# Patient Record
Sex: MALE | Race: WHITE | NOT HISPANIC OR LATINO | Employment: OTHER | URBAN - METROPOLITAN AREA
[De-identification: names, ages, dates, MRNs, and addresses within clinical notes are randomized per-mention and may not be internally consistent; named-entity substitution may affect disease eponyms.]

---

## 2020-01-05 ENCOUNTER — APPOINTMENT (OUTPATIENT)
Dept: GENERAL RADIOLOGY | Facility: HOSPITAL | Age: 75
End: 2020-01-05

## 2020-01-05 ENCOUNTER — HOSPITAL ENCOUNTER (INPATIENT)
Facility: HOSPITAL | Age: 75
LOS: 2 days | Discharge: ANOTHER HOSPITAL | End: 2020-01-08
Attending: EMERGENCY MEDICINE | Admitting: HOSPITALIST

## 2020-01-05 DIAGNOSIS — R07.9 CHEST PAIN, UNSPECIFIED TYPE: Primary | ICD-10-CM

## 2020-01-05 DIAGNOSIS — A41.9 SEPSIS, DUE TO UNSPECIFIED ORGANISM, UNSPECIFIED WHETHER ACUTE ORGAN DYSFUNCTION PRESENT (HCC): ICD-10-CM

## 2020-01-05 DIAGNOSIS — D72.829 LEUKOCYTOSIS, UNSPECIFIED TYPE: ICD-10-CM

## 2020-01-05 PROBLEM — I25.10 CORONARY ARTERY DISEASE: Chronic | Status: ACTIVE | Noted: 2020-01-05

## 2020-01-05 PROBLEM — I10 ESSENTIAL HYPERTENSION: Chronic | Status: ACTIVE | Noted: 2020-01-05

## 2020-01-05 PROBLEM — I73.9 PERIPHERAL VASCULAR DISEASE (HCC): Chronic | Status: ACTIVE | Noted: 2020-01-05

## 2020-01-05 PROBLEM — E78.5 HYPERLIPEMIA: Chronic | Status: ACTIVE | Noted: 2020-01-05

## 2020-01-05 PROBLEM — N42.9 BENIGN PROSTATIC DISEASE: Chronic | Status: ACTIVE | Noted: 2020-01-05

## 2020-01-05 PROBLEM — E11.9 DIABETES MELLITUS (HCC): Chronic | Status: ACTIVE | Noted: 2020-01-05

## 2020-01-05 PROBLEM — K21.9 GERD (GASTROESOPHAGEAL REFLUX DISEASE): Chronic | Status: ACTIVE | Noted: 2020-01-05

## 2020-01-05 PROBLEM — B37.9 CANDIDIASIS: Chronic | Status: ACTIVE | Noted: 2020-01-05

## 2020-01-05 LAB
ALBUMIN SERPL-MCNC: 3.3 G/DL (ref 3.5–5.2)
ALBUMIN/GLOB SERPL: 0.9 G/DL
ALP SERPL-CCNC: 190 U/L (ref 39–117)
ALT SERPL W P-5'-P-CCNC: 48 U/L (ref 1–41)
ANION GAP SERPL CALCULATED.3IONS-SCNC: 15 MMOL/L (ref 5–15)
APTT PPP: 20.5 SECONDS (ref 24–31)
AST SERPL-CCNC: 56 U/L (ref 1–40)
BACTERIA UR QL AUTO: ABNORMAL /HPF
BASOPHILS # BLD AUTO: 0.1 10*3/MM3 (ref 0–0.2)
BASOPHILS NFR BLD AUTO: 0.3 % (ref 0–1.5)
BILIRUB SERPL-MCNC: 0.2 MG/DL (ref 0.2–1.2)
BILIRUB UR QL STRIP: ABNORMAL
BUN BLD-MCNC: 20 MG/DL (ref 8–23)
BUN/CREAT SERPL: 29.9 (ref 7–25)
CALCIUM SPEC-SCNC: 9.7 MG/DL (ref 8.6–10.5)
CHLORIDE SERPL-SCNC: 99 MMOL/L (ref 98–107)
CLARITY UR: ABNORMAL
CO2 SERPL-SCNC: 20 MMOL/L (ref 22–29)
COLOR UR: ABNORMAL
CREAT BLD-MCNC: 0.67 MG/DL (ref 0.76–1.27)
D-LACTATE SERPL-SCNC: 3.1 MMOL/L (ref 0.5–2)
D-LACTATE SERPL-SCNC: 3.3 MMOL/L (ref 0.5–2)
DEPRECATED RDW RBC AUTO: 61.3 FL (ref 37–54)
EOSINOPHIL # BLD AUTO: 0.2 10*3/MM3 (ref 0–0.4)
EOSINOPHIL NFR BLD AUTO: 1 % (ref 0.3–6.2)
ERYTHROCYTE [DISTWIDTH] IN BLOOD BY AUTOMATED COUNT: 21.1 % (ref 12.3–15.4)
FLUAV SUBTYP SPEC NAA+PROBE: NOT DETECTED
FLUBV RNA ISLT QL NAA+PROBE: NOT DETECTED
GFR SERPL CREATININE-BSD FRML MDRD: 116 ML/MIN/1.73
GFR SERPL CREATININE-BSD FRML MDRD: 140 ML/MIN/1.73
GLOBULIN UR ELPH-MCNC: 3.6 GM/DL
GLUCOSE BLD-MCNC: 377 MG/DL (ref 65–99)
GLUCOSE BLDC GLUCOMTR-MCNC: 388 MG/DL (ref 70–105)
GLUCOSE UR STRIP-MCNC: NEGATIVE MG/DL
HCT VFR BLD AUTO: 34.8 % (ref 37.5–51)
HGB BLD-MCNC: 10.9 G/DL (ref 13–17.7)
HGB UR QL STRIP.AUTO: ABNORMAL
HOLD SPECIMEN: NORMAL
HYALINE CASTS UR QL AUTO: ABNORMAL /LPF
INR PPP: 1.03 (ref 0.9–1.1)
KETONES UR QL STRIP: ABNORMAL
LEUKOCYTE ESTERASE UR QL STRIP.AUTO: ABNORMAL
LYMPHOCYTES # BLD AUTO: 1.2 10*3/MM3 (ref 0.7–3.1)
LYMPHOCYTES NFR BLD AUTO: 5.5 % (ref 19.6–45.3)
MCH RBC QN AUTO: 25.6 PG (ref 26.6–33)
MCHC RBC AUTO-ENTMCNC: 31.4 G/DL (ref 31.5–35.7)
MCV RBC AUTO: 81.3 FL (ref 79–97)
MONOCYTES # BLD AUTO: 1.2 10*3/MM3 (ref 0.1–0.9)
MONOCYTES NFR BLD AUTO: 5.9 % (ref 5–12)
NEUTROPHILS # BLD AUTO: 18.4 10*3/MM3 (ref 1.7–7)
NEUTROPHILS NFR BLD AUTO: 87.3 % (ref 42.7–76)
NITRITE UR QL STRIP: POSITIVE
NRBC BLD AUTO-RTO: 0 /100 WBC (ref 0–0.2)
NT-PROBNP SERPL-MCNC: 1272 PG/ML (ref 5–1800)
NT-PROBNP SERPL-MCNC: 810.6 PG/ML (ref 5–1800)
PH UR STRIP.AUTO: 5.5 [PH] (ref 5–8)
PLATELET # BLD AUTO: 487 10*3/MM3 (ref 140–450)
PMV BLD AUTO: 6.4 FL (ref 6–12)
POTASSIUM BLD-SCNC: 4.4 MMOL/L (ref 3.5–5.2)
PROT SERPL-MCNC: 6.9 G/DL (ref 6–8.5)
PROT UR QL STRIP: ABNORMAL
PROTHROMBIN TIME: 10.8 SECONDS (ref 9.6–11.7)
RBC # BLD AUTO: 4.28 10*6/MM3 (ref 4.14–5.8)
RBC # UR: ABNORMAL /HPF
REF LAB TEST METHOD: ABNORMAL
SODIUM BLD-SCNC: 134 MMOL/L (ref 136–145)
SP GR UR STRIP: 1.02 (ref 1–1.03)
SQUAMOUS #/AREA URNS HPF: ABNORMAL /HPF
TROPONIN T SERPL-MCNC: 0.04 NG/ML (ref 0–0.03)
TROPONIN T SERPL-MCNC: 0.3 NG/ML (ref 0–0.03)
UROBILINOGEN UR QL STRIP: ABNORMAL
WBC NRBC COR # BLD: 21.1 10*3/MM3 (ref 3.4–10.8)
WBC UR QL AUTO: ABNORMAL /HPF

## 2020-01-05 PROCEDURE — 25010000002 CEFEPIME PER 500 MG: Performed by: EMERGENCY MEDICINE

## 2020-01-05 PROCEDURE — 93005 ELECTROCARDIOGRAM TRACING: CPT

## 2020-01-05 PROCEDURE — 84484 ASSAY OF TROPONIN QUANT: CPT | Performed by: EMERGENCY MEDICINE

## 2020-01-05 PROCEDURE — 25010000002 MICAFUNGIN SODIUM 100 MG RECONSTITUTED SOLUTION 1 EACH VIAL: Performed by: INTERNAL MEDICINE

## 2020-01-05 PROCEDURE — 63710000001 INSULIN GLARGINE PER 5 UNITS: Performed by: INTERNAL MEDICINE

## 2020-01-05 PROCEDURE — 87186 SC STD MICRODIL/AGAR DIL: CPT | Performed by: EMERGENCY MEDICINE

## 2020-01-05 PROCEDURE — 71045 X-RAY EXAM CHEST 1 VIEW: CPT

## 2020-01-05 PROCEDURE — 93005 ELECTROCARDIOGRAM TRACING: CPT | Performed by: EMERGENCY MEDICINE

## 2020-01-05 PROCEDURE — 25010000002 MICAFUNGIN PER 1 MG: Performed by: INTERNAL MEDICINE

## 2020-01-05 PROCEDURE — 83880 ASSAY OF NATRIURETIC PEPTIDE: CPT | Performed by: INTERNAL MEDICINE

## 2020-01-05 PROCEDURE — 83880 ASSAY OF NATRIURETIC PEPTIDE: CPT | Performed by: EMERGENCY MEDICINE

## 2020-01-05 PROCEDURE — 85610 PROTHROMBIN TIME: CPT | Performed by: EMERGENCY MEDICINE

## 2020-01-05 PROCEDURE — 85730 THROMBOPLASTIN TIME PARTIAL: CPT | Performed by: EMERGENCY MEDICINE

## 2020-01-05 PROCEDURE — 84484 ASSAY OF TROPONIN QUANT: CPT | Performed by: INTERNAL MEDICINE

## 2020-01-05 PROCEDURE — 81001 URINALYSIS AUTO W/SCOPE: CPT | Performed by: EMERGENCY MEDICINE

## 2020-01-05 PROCEDURE — 63710000001 INSULIN LISPRO (HUMAN) PER 5 UNITS: Performed by: INTERNAL MEDICINE

## 2020-01-05 PROCEDURE — 83605 ASSAY OF LACTIC ACID: CPT

## 2020-01-05 PROCEDURE — 87502 INFLUENZA DNA AMP PROBE: CPT | Performed by: EMERGENCY MEDICINE

## 2020-01-05 PROCEDURE — P9612 CATHETERIZE FOR URINE SPEC: HCPCS

## 2020-01-05 PROCEDURE — 82962 GLUCOSE BLOOD TEST: CPT

## 2020-01-05 PROCEDURE — 87040 BLOOD CULTURE FOR BACTERIA: CPT | Performed by: EMERGENCY MEDICINE

## 2020-01-05 PROCEDURE — 99223 1ST HOSP IP/OBS HIGH 75: CPT | Performed by: INTERNAL MEDICINE

## 2020-01-05 PROCEDURE — 87086 URINE CULTURE/COLONY COUNT: CPT | Performed by: EMERGENCY MEDICINE

## 2020-01-05 PROCEDURE — 99285 EMERGENCY DEPT VISIT HI MDM: CPT

## 2020-01-05 PROCEDURE — G0378 HOSPITAL OBSERVATION PER HR: HCPCS

## 2020-01-05 PROCEDURE — 80053 COMPREHEN METABOLIC PANEL: CPT | Performed by: EMERGENCY MEDICINE

## 2020-01-05 PROCEDURE — 87150 DNA/RNA AMPLIFIED PROBE: CPT | Performed by: EMERGENCY MEDICINE

## 2020-01-05 PROCEDURE — 87077 CULTURE AEROBIC IDENTIFY: CPT | Performed by: EMERGENCY MEDICINE

## 2020-01-05 PROCEDURE — 85025 COMPLETE CBC W/AUTO DIFF WBC: CPT | Performed by: EMERGENCY MEDICINE

## 2020-01-05 RX ORDER — ASPIRIN 325 MG
325 TABLET ORAL DAILY
COMMUNITY

## 2020-01-05 RX ORDER — SODIUM CHLORIDE 0.9 % (FLUSH) 0.9 %
10 SYRINGE (ML) INJECTION AS NEEDED
Status: DISCONTINUED | OUTPATIENT
Start: 2020-01-05 | End: 2020-01-08 | Stop reason: HOSPADM

## 2020-01-05 RX ORDER — FAMOTIDINE 20 MG/1
20 TABLET, FILM COATED ORAL 2 TIMES DAILY PRN
COMMUNITY

## 2020-01-05 RX ORDER — ASPIRIN 325 MG
325 TABLET ORAL DAILY
Status: DISCONTINUED | OUTPATIENT
Start: 2020-01-05 | End: 2020-01-08 | Stop reason: HOSPADM

## 2020-01-05 RX ORDER — DIAPER,BRIEF,INFANT-TODD,DISP
1 EACH MISCELLANEOUS EVERY 12 HOURS SCHEDULED
Status: DISCONTINUED | OUTPATIENT
Start: 2020-01-05 | End: 2020-01-08 | Stop reason: HOSPADM

## 2020-01-05 RX ORDER — PANTOPRAZOLE SODIUM 40 MG/1
40 TABLET, DELAYED RELEASE ORAL EVERY MORNING
Status: DISCONTINUED | OUTPATIENT
Start: 2020-01-06 | End: 2020-01-08 | Stop reason: HOSPADM

## 2020-01-05 RX ORDER — CRANBERRY CONC/C/BACILL COAG 250-30-15
1 TABLET ORAL DAILY PRN
COMMUNITY

## 2020-01-05 RX ORDER — NITROGLYCERIN 0.4 MG/1
0.4 TABLET SUBLINGUAL
Status: DISCONTINUED | OUTPATIENT
Start: 2020-01-05 | End: 2020-01-08 | Stop reason: HOSPADM

## 2020-01-05 RX ORDER — SODIUM CHLORIDE 0.9 % (FLUSH) 0.9 %
10 SYRINGE (ML) INJECTION EVERY 12 HOURS SCHEDULED
Status: DISCONTINUED | OUTPATIENT
Start: 2020-01-05 | End: 2020-01-08 | Stop reason: HOSPADM

## 2020-01-05 RX ORDER — ONDANSETRON 4 MG/1
4 TABLET, FILM COATED ORAL EVERY 6 HOURS PRN
Status: DISCONTINUED | OUTPATIENT
Start: 2020-01-05 | End: 2020-01-08 | Stop reason: HOSPADM

## 2020-01-05 RX ORDER — TAMSULOSIN HYDROCHLORIDE 0.4 MG/1
0.4 CAPSULE ORAL DAILY
COMMUNITY

## 2020-01-05 RX ORDER — IPRATROPIUM BROMIDE AND ALBUTEROL SULFATE 2.5; .5 MG/3ML; MG/3ML
3 SOLUTION RESPIRATORY (INHALATION) 3 TIMES DAILY PRN
COMMUNITY

## 2020-01-05 RX ORDER — INSULIN GLARGINE 100 [IU]/ML
10 INJECTION, SOLUTION SUBCUTANEOUS NIGHTLY
Status: DISCONTINUED | OUTPATIENT
Start: 2020-01-05 | End: 2020-01-08 | Stop reason: HOSPADM

## 2020-01-05 RX ORDER — GABAPENTIN 100 MG/1
100 CAPSULE ORAL 2 TIMES DAILY
Status: DISCONTINUED | OUTPATIENT
Start: 2020-01-05 | End: 2020-01-08 | Stop reason: HOSPADM

## 2020-01-05 RX ORDER — ATORVASTATIN CALCIUM 40 MG/1
40 TABLET, FILM COATED ORAL DAILY
Status: DISCONTINUED | OUTPATIENT
Start: 2020-01-05 | End: 2020-01-08 | Stop reason: HOSPADM

## 2020-01-05 RX ORDER — NICOTINE POLACRILEX 4 MG
15 LOZENGE BUCCAL
Status: DISCONTINUED | OUTPATIENT
Start: 2020-01-05 | End: 2020-01-08 | Stop reason: HOSPADM

## 2020-01-05 RX ORDER — GLIPIZIDE 10 MG/1
10 TABLET ORAL
COMMUNITY

## 2020-01-05 RX ORDER — METOPROLOL TARTRATE 50 MG/1
50 TABLET, FILM COATED ORAL 2 TIMES DAILY
COMMUNITY

## 2020-01-05 RX ORDER — ONDANSETRON 2 MG/ML
4 INJECTION INTRAMUSCULAR; INTRAVENOUS EVERY 6 HOURS PRN
Status: DISCONTINUED | OUTPATIENT
Start: 2020-01-05 | End: 2020-01-08 | Stop reason: HOSPADM

## 2020-01-05 RX ORDER — DIAPER,BRIEF,INFANT-TODD,DISP
1 EACH MISCELLANEOUS 2 TIMES DAILY PRN
COMMUNITY

## 2020-01-05 RX ORDER — TAMSULOSIN HYDROCHLORIDE 0.4 MG/1
0.4 CAPSULE ORAL NIGHTLY
Status: DISCONTINUED | OUTPATIENT
Start: 2020-01-05 | End: 2020-01-08 | Stop reason: HOSPADM

## 2020-01-05 RX ORDER — ATORVASTATIN CALCIUM 40 MG/1
40 TABLET, FILM COATED ORAL DAILY
COMMUNITY

## 2020-01-05 RX ORDER — GABAPENTIN 100 MG/1
100 CAPSULE ORAL 2 TIMES DAILY
COMMUNITY
Start: 2019-12-04

## 2020-01-05 RX ORDER — IPRATROPIUM BROMIDE AND ALBUTEROL SULFATE 2.5; .5 MG/3ML; MG/3ML
3 SOLUTION RESPIRATORY (INHALATION) 3 TIMES DAILY PRN
Status: DISCONTINUED | OUTPATIENT
Start: 2020-01-05 | End: 2020-01-08 | Stop reason: HOSPADM

## 2020-01-05 RX ORDER — HYDROCODONE BITARTRATE AND ACETAMINOPHEN 5; 325 MG/1; MG/1
1 TABLET ORAL EVERY 6 HOURS PRN
Status: DISCONTINUED | OUTPATIENT
Start: 2020-01-05 | End: 2020-01-08 | Stop reason: HOSPADM

## 2020-01-05 RX ORDER — NITROGLYCERIN 0.4 MG/1
0.4 TABLET SUBLINGUAL
COMMUNITY

## 2020-01-05 RX ORDER — METOPROLOL TARTRATE 50 MG/1
50 TABLET, FILM COATED ORAL EVERY 12 HOURS SCHEDULED
Status: DISCONTINUED | OUTPATIENT
Start: 2020-01-05 | End: 2020-01-06

## 2020-01-05 RX ORDER — HYDROCODONE BITARTRATE AND ACETAMINOPHEN 7.5; 325 MG/1; MG/1
1 TABLET ORAL ONCE
Status: COMPLETED | OUTPATIENT
Start: 2020-01-05 | End: 2020-01-05

## 2020-01-05 RX ORDER — DEXTROSE MONOHYDRATE 25 G/50ML
25 INJECTION, SOLUTION INTRAVENOUS
Status: DISCONTINUED | OUTPATIENT
Start: 2020-01-05 | End: 2020-01-08 | Stop reason: HOSPADM

## 2020-01-05 RX ORDER — DOCUSATE SODIUM 100 MG/1
100 CAPSULE, LIQUID FILLED ORAL 2 TIMES DAILY PRN
Status: DISCONTINUED | OUTPATIENT
Start: 2020-01-05 | End: 2020-01-08 | Stop reason: HOSPADM

## 2020-01-05 RX ORDER — ZINC OXIDE 20 %
1 OINTMENT (GRAM) TOPICAL 2 TIMES DAILY PRN
COMMUNITY
End: 2020-01-05

## 2020-01-05 RX ADMIN — ATORVASTATIN CALCIUM 40 MG: 40 TABLET, FILM COATED ORAL at 21:57

## 2020-01-05 RX ADMIN — SODIUM CHLORIDE 500 ML: 900 INJECTION, SOLUTION INTRAVENOUS at 14:30

## 2020-01-05 RX ADMIN — HYDROCODONE BITARTRATE AND ACETAMINOPHEN 1 TABLET: 5; 325 TABLET ORAL at 21:57

## 2020-01-05 RX ADMIN — GABAPENTIN 100 MG: 100 CAPSULE ORAL at 21:57

## 2020-01-05 RX ADMIN — INSULIN LISPRO 9 UNITS: 100 INJECTION, SOLUTION INTRAVENOUS; SUBCUTANEOUS at 22:00

## 2020-01-05 RX ADMIN — INSULIN GLARGINE 10 UNITS: 100 INJECTION, SOLUTION SUBCUTANEOUS at 21:58

## 2020-01-05 RX ADMIN — Medication 10 ML: at 22:03

## 2020-01-05 RX ADMIN — HYDROCODONE BITARTRATE AND ACETAMINOPHEN 1 TABLET: 7.5; 325 TABLET ORAL at 14:41

## 2020-01-05 RX ADMIN — MICAFUNGIN SODIUM 150 MG: 10 INJECTION, POWDER, LYOPHILIZED, FOR SOLUTION INTRAVENOUS at 20:56

## 2020-01-05 RX ADMIN — TAMSULOSIN HYDROCHLORIDE 0.4 MG: 0.4 CAPSULE ORAL at 22:03

## 2020-01-05 RX ADMIN — METOPROLOL TARTRATE 50 MG: 50 TABLET, FILM COATED ORAL at 22:00

## 2020-01-05 RX ADMIN — SODIUM CHLORIDE 500 ML: 900 INJECTION, SOLUTION INTRAVENOUS at 12:32

## 2020-01-05 RX ADMIN — CEFEPIME HYDROCHLORIDE 2 G: 2 INJECTION, POWDER, FOR SOLUTION INTRAVENOUS at 14:38

## 2020-01-05 NOTE — ED PROVIDER NOTES
Subjective   74-year-old male with a history of coronary artery disease presents with chest pain.  Patient states the pain started this morning and has been constant.  The pain is in the center of his chest and radiates to his neck.  He has had associated shortness of breath and diaphoresis.  He denies any alleviating or exacerbating factors.  Pain is described as moderate in intensity.      History provided by:  Patient      Review of Systems   Constitutional: Positive for diaphoresis. Negative for fever.   HENT: Negative for congestion and sore throat.    Eyes: Negative for pain and redness.   Respiratory: Positive for shortness of breath. Negative for cough.    Cardiovascular: Positive for chest pain. Negative for palpitations.   Gastrointestinal: Negative for abdominal pain and vomiting.   Genitourinary: Negative for dysuria and frequency.   Musculoskeletal: Negative for back pain.   Skin: Negative for rash.   Neurological: Negative for dizziness and headaches.   Psychiatric/Behavioral: Negative for behavioral problems and confusion.       Past Medical History:   Diagnosis Date   • Angina at rest (CMS/[facility])    • Benign prostatic disease    • Candidiasis    • Constipation    • Cutaneous abscess     right lower limb   • CVA (cerebral vascular accident) (CMS/[facility])    • Diabetes mellitus (CMS/[facility])     Type 2   • Essential hypertension    • GERD (gastroesophageal reflux disease)    • Hyperlipemia    • Hypoglycemia    • Iron deficiency anemia    • Peripheral vascular disease (CMS/[facility])    • Urethral stricture    • UTI (urinary tract infection)        Allergies   Allergen Reactions   • Keflex [Cephalexin] Unknown - High Severity     Unknown     • Lisinopril Unknown - High Severity     unknown       History reviewed. No pertinent surgical history.    History reviewed. No pertinent family history.    Social History     Socioeconomic History   • Marital status:      Spouse name: Not on file   • Number of children: Not  "on file   • Years of education: Not on file   • Highest education level: Not on file       /68 (BP Location: Right arm, Patient Position: Lying)   Pulse 114   Temp 99.3 °F (37.4 °C) (Rectal)   Resp 16   Ht 180.3 cm (71\")   Wt 70.3 kg (155 lb)   SpO2 100%   BMI 21.62 kg/m²       Objective   Physical Exam   Constitutional: He is oriented to person, place, and time. He appears well-developed and well-nourished.   HENT:   Head: Normocephalic and atraumatic.   Eyes: Pupils are equal, round, and reactive to light. EOM are normal.   Neck: Normal range of motion. Neck supple.   Cardiovascular: Normal rate, regular rhythm and normal heart sounds.   Pulmonary/Chest: Effort normal and breath sounds normal. No respiratory distress.   Abdominal: Soft. Bowel sounds are normal. There is no tenderness.   Genitourinary:   Genitourinary Comments: Indwelling Cedeno catheter present   Musculoskeletal:   Right AKA, there is a wound VAC in place to the right groin area   Neurological: He is alert and oriented to person, place, and time.   Skin: Skin is warm and dry.   Nursing note and vitals reviewed.      Procedures           ED Course      My interpretation of EKG shows sinus tachycardia, rate of 118, incomplete left bundle branch block     Results for orders placed or performed during the hospital encounter of 01/05/20   Comprehensive Metabolic Panel   Result Value Ref Range    Glucose 377 (H) 65 - 99 mg/dL    BUN 20 8 - 23 mg/dL    Creatinine 0.67 (L) 0.76 - 1.27 mg/dL    Sodium 134 (L) 136 - 145 mmol/L    Potassium 4.4 3.5 - 5.2 mmol/L    Chloride 99 98 - 107 mmol/L    CO2 20.0 (L) 22.0 - 29.0 mmol/L    Calcium 9.7 8.6 - 10.5 mg/dL    Total Protein 6.9 6.0 - 8.5 g/dL    Albumin 3.30 (L) 3.50 - 5.20 g/dL    ALT (SGPT) 48 (H) 1 - 41 U/L    AST (SGOT) 56 (H) 1 - 40 U/L    Alkaline Phosphatase 190 (H) 39 - 117 U/L    Total Bilirubin 0.2 0.2 - 1.2 mg/dL    eGFR Non African Amer 116 >60 mL/min/1.73    eGFR  African Amer 140 " >60 mL/min/1.73    Globulin 3.6 gm/dL    A/G Ratio 0.9 g/dL    BUN/Creatinine Ratio 29.9 (H) 7.0 - 25.0    Anion Gap 15.0 5.0 - 15.0 mmol/L   Protime-INR   Result Value Ref Range    Protime 10.8 9.6 - 11.7 Seconds    INR 1.03 0.90 - 1.10   aPTT   Result Value Ref Range    PTT 20.5 (L) 24.0 - 31.0 seconds   Troponin   Result Value Ref Range    Troponin T 0.039 (C) 0.000 - 0.030 ng/mL   BNP   Result Value Ref Range    proBNP 1,272.0 5.0-1,800.0 pg/mL   CBC Auto Differential   Result Value Ref Range    WBC 21.10 (H) 3.40 - 10.80 10*3/mm3    RBC 4.28 4.14 - 5.80 10*6/mm3    Hemoglobin 10.9 (L) 13.0 - 17.7 g/dL    Hematocrit 34.8 (L) 37.5 - 51.0 %    MCV 81.3 79.0 - 97.0 fL    MCH 25.6 (L) 26.6 - 33.0 pg    MCHC 31.4 (L) 31.5 - 35.7 g/dL    RDW 21.1 (H) 12.3 - 15.4 %    RDW-SD 61.3 (H) 37.0 - 54.0 fl    MPV 6.4 6.0 - 12.0 fL    Platelets 487 (H) 140 - 450 10*3/mm3    Neutrophil % 87.3 (H) 42.7 - 76.0 %    Lymphocyte % 5.5 (L) 19.6 - 45.3 %    Monocyte % 5.9 5.0 - 12.0 %    Eosinophil % 1.0 0.3 - 6.2 %    Basophil % 0.3 0.0 - 1.5 %    Neutrophils, Absolute 18.40 (H) 1.70 - 7.00 10*3/mm3    Lymphocytes, Absolute 1.20 0.70 - 3.10 10*3/mm3    Monocytes, Absolute 1.20 (H) 0.10 - 0.90 10*3/mm3    Eosinophils, Absolute 0.20 0.00 - 0.40 10*3/mm3    Basophils, Absolute 0.10 0.00 - 0.20 10*3/mm3    nRBC 0.0 0.0 - 0.2 /100 WBC     Xr Chest 1 View    Result Date: 1/5/2020   1. Low lung volumes. 2. No active pulmonary disease.  Electronically Signed By-John Chavira On:1/5/2020 1:10 PM This report was finalized on [id number] by  John Chavira, .                                        MDM   Patient had the above evaluation.  Results were discussed with the patient and family.  Chest x-ray shows no acute disease.  EKG shows no acute ischemia.  Troponin is borderline elevated at 0.039.  Patient was also found to have a white blood cell count of 21,000.  Patient does meet sepsis criteria and sepsis protocol was initiated.  Patient was  given a dose of cefepime.  Blood cultures were obtained.  Lactic acid is pending along with urinalysis and flu screen.  I discussed with the hospitalist and the patient will be admitted for further evaluation and management.      Final diagnoses:   Chest pain, unspecified type   Sepsis, due to unspecified organism, unspecified whether acute organ dysfunction present (CMS/[facility])   Leukocytosis, unspecified type            Darell Franco MD  01/05/20 4993

## 2020-01-06 ENCOUNTER — APPOINTMENT (OUTPATIENT)
Dept: CARDIOLOGY | Facility: HOSPITAL | Age: 75
End: 2020-01-06

## 2020-01-06 ENCOUNTER — APPOINTMENT (OUTPATIENT)
Dept: CT IMAGING | Facility: HOSPITAL | Age: 75
End: 2020-01-06

## 2020-01-06 LAB
ANION GAP SERPL CALCULATED.3IONS-SCNC: 14 MMOL/L (ref 5–15)
BACTERIA BLD CULT: ABNORMAL
BASOPHILS # BLD AUTO: 0.1 10*3/MM3 (ref 0–0.2)
BASOPHILS NFR BLD AUTO: 0.5 % (ref 0–1.5)
BH CV ECHO MEAS - AO MAX PG (FULL): 10.3 MMHG
BH CV ECHO MEAS - AO MAX PG: 13.7 MMHG
BH CV ECHO MEAS - AO MEAN PG (FULL): 5 MMHG
BH CV ECHO MEAS - AO MEAN PG: 6.3 MMHG
BH CV ECHO MEAS - AO ROOT AREA (BSA CORRECTED): 1.8
BH CV ECHO MEAS - AO ROOT AREA: 9.2 CM^2
BH CV ECHO MEAS - AO ROOT DIAM: 3.4 CM
BH CV ECHO MEAS - AO V2 MAX: 184.8 CM/SEC
BH CV ECHO MEAS - AO V2 MEAN: 112.7 CM/SEC
BH CV ECHO MEAS - AO V2 VTI: 22.3 CM
BH CV ECHO MEAS - AORTIC HR: 119.4 BPM
BH CV ECHO MEAS - AORTIC R-R: 0.5 SEC
BH CV ECHO MEAS - ASC AORTA: 3 CM
BH CV ECHO MEAS - AVA(I,A): 2.3 CM^2
BH CV ECHO MEAS - AVA(I,D): 2.3 CM^2
BH CV ECHO MEAS - AVA(V,A): 1.9 CM^2
BH CV ECHO MEAS - AVA(V,D): 1.9 CM^2
BH CV ECHO MEAS - BSA(HAYCOCK): 1.9 M^2
BH CV ECHO MEAS - BSA: 1.9 M^2
BH CV ECHO MEAS - BZI_BMI: 21.8 KILOGRAMS/M^2
BH CV ECHO MEAS - BZI_METRIC_HEIGHT: 180.3 CM
BH CV ECHO MEAS - BZI_METRIC_WEIGHT: 70.8 KG
BH CV ECHO MEAS - CI(AO): 13 L/MIN/M^2
BH CV ECHO MEAS - CI(LVOT): 3.2 L/MIN/M^2
BH CV ECHO MEAS - CO(AO): 24.6 L/MIN
BH CV ECHO MEAS - CO(LVOT): 6.1 L/MIN
BH CV ECHO MEAS - EDV(CUBED): 207.6 ML
BH CV ECHO MEAS - EDV(MOD-SP4): 125.9 ML
BH CV ECHO MEAS - EDV(TEICH): 174.6 ML
BH CV ECHO MEAS - EF(CUBED): 13.5 %
BH CV ECHO MEAS - EF(MOD-BP): 31 %
BH CV ECHO MEAS - EF(MOD-SP4): 31.2 %
BH CV ECHO MEAS - EF(TEICH): 10.5 %
BH CV ECHO MEAS - ESV(CUBED): 179.6 ML
BH CV ECHO MEAS - ESV(MOD-SP4): 86.6 ML
BH CV ECHO MEAS - ESV(TEICH): 156.3 ML
BH CV ECHO MEAS - FS: 4.7 %
BH CV ECHO MEAS - IVS/LVPW: 0.75
BH CV ECHO MEAS - IVSD: 1 CM
BH CV ECHO MEAS - LA DIMENSION(2D): 4.7 CM
BH CV ECHO MEAS - LV DIASTOLIC VOL/BSA (35-75): 66.3 ML/M^2
BH CV ECHO MEAS - LV MASS(C)D: 306.7 GRAMS
BH CV ECHO MEAS - LV MASS(C)DI: 161.7 GRAMS/M^2
BH CV ECHO MEAS - LV MAX PG: 3.4 MMHG
BH CV ECHO MEAS - LV MEAN PG: 1.3 MMHG
BH CV ECHO MEAS - LV SYSTOLIC VOL/BSA (12-30): 45.6 ML/M^2
BH CV ECHO MEAS - LV V1 MAX: 91.7 CM/SEC
BH CV ECHO MEAS - LV V1 MEAN: 49.7 CM/SEC
BH CV ECHO MEAS - LV V1 VTI: 13.6 CM
BH CV ECHO MEAS - LVIDD: 5.9 CM
BH CV ECHO MEAS - LVIDS: 5.6 CM
BH CV ECHO MEAS - LVOT AREA: 3.8 CM^2
BH CV ECHO MEAS - LVOT DIAM: 2.2 CM
BH CV ECHO MEAS - LVPWD: 1.4 CM
BH CV ECHO MEAS - MR MAX PG: 83.5 MMHG
BH CV ECHO MEAS - MR MAX VEL: 456.8 CM/SEC
BH CV ECHO MEAS - MV A MAX VEL: 99.7 CM/SEC
BH CV ECHO MEAS - MV DEC SLOPE: 1140 CM/SEC^2
BH CV ECHO MEAS - MV DEC TIME: 0.04 SEC
BH CV ECHO MEAS - MV E MAX VEL: 48.2 CM/SEC
BH CV ECHO MEAS - MV E/A: 0.48
BH CV ECHO MEAS - MV MAX PG: 5.4 MMHG
BH CV ECHO MEAS - MV MEAN PG: 2.5 MMHG
BH CV ECHO MEAS - MV V2 MAX: 116.2 CM/SEC
BH CV ECHO MEAS - MV V2 MEAN: 74 CM/SEC
BH CV ECHO MEAS - MV V2 VTI: 16.5 CM
BH CV ECHO MEAS - MVA(VTI): 3.1 CM^2
BH CV ECHO MEAS - PA ACC TIME: 0.08 SEC
BH CV ECHO MEAS - PA MAX PG (FULL): 0.42 MMHG
BH CV ECHO MEAS - PA MAX PG: 2.4 MMHG
BH CV ECHO MEAS - PA MEAN PG (FULL): 0.35 MMHG
BH CV ECHO MEAS - PA MEAN PG: 1.2 MMHG
BH CV ECHO MEAS - PA PR(ACCEL): 44.7 MMHG
BH CV ECHO MEAS - PA V2 MAX: 77.5 CM/SEC
BH CV ECHO MEAS - PA V2 MEAN: 50 CM/SEC
BH CV ECHO MEAS - PA V2 VTI: 11.6 CM
BH CV ECHO MEAS - PVA(I,A): 4.2 CM^2
BH CV ECHO MEAS - PVA(I,D): 4.2 CM^2
BH CV ECHO MEAS - PVA(V,A): 4.8 CM^2
BH CV ECHO MEAS - PVA(V,D): 4.8 CM^2
BH CV ECHO MEAS - QP/QS: 0.94
BH CV ECHO MEAS - RAP SYSTOLE: 3 MMHG
BH CV ECHO MEAS - RV MAX PG: 2 MMHG
BH CV ECHO MEAS - RV MEAN PG: 0.86 MMHG
BH CV ECHO MEAS - RV V1 MAX: 70.4 CM/SEC
BH CV ECHO MEAS - RV V1 MEAN: 40.6 CM/SEC
BH CV ECHO MEAS - RV V1 VTI: 9.1 CM
BH CV ECHO MEAS - RVDD: 2.7 CM
BH CV ECHO MEAS - RVOT AREA: 5.3 CM^2
BH CV ECHO MEAS - RVOT DIAM: 2.6 CM
BH CV ECHO MEAS - RVSP: 14.2 MMHG
BH CV ECHO MEAS - SI(AO): 108.6 ML/M^2
BH CV ECHO MEAS - SI(CUBED): 14.7 ML/M^2
BH CV ECHO MEAS - SI(LVOT): 27.1 ML/M^2
BH CV ECHO MEAS - SI(MOD-SP4): 20.7 ML/M^2
BH CV ECHO MEAS - SI(TEICH): 9.6 ML/M^2
BH CV ECHO MEAS - SV(AO): 206 ML
BH CV ECHO MEAS - SV(CUBED): 28 ML
BH CV ECHO MEAS - SV(LVOT): 51.5 ML
BH CV ECHO MEAS - SV(MOD-SP4): 39.3 ML
BH CV ECHO MEAS - SV(RVOT): 48.4 ML
BH CV ECHO MEAS - SV(TEICH): 18.3 ML
BH CV ECHO MEAS - TR MAX VEL: 167.4 CM/SEC
BOTTLE TYPE: ABNORMAL
BUN BLD-MCNC: 37 MG/DL (ref 8–23)
BUN/CREAT SERPL: 50.7 (ref 7–25)
CA-I SERPL ISE-MCNC: 1.27 MMOL/L (ref 1.2–1.3)
CALCIUM SPEC-SCNC: 9.2 MG/DL (ref 8.6–10.5)
CHLORIDE SERPL-SCNC: 95 MMOL/L (ref 98–107)
CHOLEST SERPL-MCNC: 121 MG/DL (ref 0–200)
CK SERPL-CCNC: 102 U/L (ref 20–200)
CK SERPL-CCNC: 94 U/L (ref 20–200)
CO2 SERPL-SCNC: 21 MMOL/L (ref 22–29)
CREAT BLD-MCNC: 0.73 MG/DL (ref 0.76–1.27)
DEPRECATED RDW RBC AUTO: 58.2 FL (ref 37–54)
EOSINOPHIL # BLD AUTO: 0 10*3/MM3 (ref 0–0.4)
EOSINOPHIL NFR BLD AUTO: 0.1 % (ref 0.3–6.2)
ERYTHROCYTE [DISTWIDTH] IN BLOOD BY AUTOMATED COUNT: 20.7 % (ref 12.3–15.4)
GFR SERPL CREATININE-BSD FRML MDRD: 105 ML/MIN/1.73
GLUCOSE BLD-MCNC: 380 MG/DL (ref 65–99)
GLUCOSE BLDC GLUCOMTR-MCNC: 222 MG/DL (ref 70–105)
GLUCOSE BLDC GLUCOMTR-MCNC: 263 MG/DL (ref 70–105)
GLUCOSE BLDC GLUCOMTR-MCNC: 305 MG/DL (ref 70–105)
GLUCOSE BLDC GLUCOMTR-MCNC: 312 MG/DL (ref 70–105)
HCT VFR BLD AUTO: 29.7 % (ref 37.5–51)
HDLC SERPL-MCNC: 34 MG/DL (ref 40–60)
HGB BLD-MCNC: 9.7 G/DL (ref 13–17.7)
LDLC SERPL CALC-MCNC: 71 MG/DL (ref 0–100)
LDLC/HDLC SERPL: 2.1 {RATIO}
LV EF 2D ECHO EST: 35 %
LYMPHOCYTES # BLD AUTO: 1.1 10*3/MM3 (ref 0.7–3.1)
LYMPHOCYTES NFR BLD AUTO: 7.8 % (ref 19.6–45.3)
MAGNESIUM SERPL-MCNC: 2.5 MG/DL (ref 1.6–2.4)
MCH RBC QN AUTO: 26.2 PG (ref 26.6–33)
MCHC RBC AUTO-ENTMCNC: 32.9 G/DL (ref 31.5–35.7)
MCV RBC AUTO: 79.8 FL (ref 79–97)
MONOCYTES # BLD AUTO: 1.5 10*3/MM3 (ref 0.1–0.9)
MONOCYTES NFR BLD AUTO: 10.7 % (ref 5–12)
NEUTROPHILS # BLD AUTO: 11.3 10*3/MM3 (ref 1.7–7)
NEUTROPHILS NFR BLD AUTO: 80.9 % (ref 42.7–76)
NRBC BLD AUTO-RTO: 0 /100 WBC (ref 0–0.2)
PHOSPHATE SERPL-MCNC: 4.3 MG/DL (ref 2.5–4.5)
PLATELET # BLD AUTO: 468 10*3/MM3 (ref 140–450)
PMV BLD AUTO: 6.4 FL (ref 6–12)
POTASSIUM BLD-SCNC: 5.2 MMOL/L (ref 3.5–5.2)
RBC # BLD AUTO: 3.71 10*6/MM3 (ref 4.14–5.8)
SODIUM BLD-SCNC: 130 MMOL/L (ref 136–145)
TRIGL SERPL-MCNC: 78 MG/DL (ref 0–150)
TROPONIN T SERPL-MCNC: 0.46 NG/ML (ref 0–0.03)
TSH SERPL DL<=0.05 MIU/L-ACNC: 3.99 UIU/ML (ref 0.27–4.2)
VLDLC SERPL-MCNC: 15.6 MG/DL
WBC NRBC COR # BLD: 14 10*3/MM3 (ref 3.4–10.8)

## 2020-01-06 PROCEDURE — 87150 DNA/RNA AMPLIFIED PROBE: CPT | Performed by: INTERNAL MEDICINE

## 2020-01-06 PROCEDURE — 87077 CULTURE AEROBIC IDENTIFY: CPT | Performed by: INTERNAL MEDICINE

## 2020-01-06 PROCEDURE — 84443 ASSAY THYROID STIM HORMONE: CPT | Performed by: INTERNAL MEDICINE

## 2020-01-06 PROCEDURE — 99232 SBSQ HOSP IP/OBS MODERATE 35: CPT | Performed by: HOSPITALIST

## 2020-01-06 PROCEDURE — 82962 GLUCOSE BLOOD TEST: CPT

## 2020-01-06 PROCEDURE — 83735 ASSAY OF MAGNESIUM: CPT | Performed by: INTERNAL MEDICINE

## 2020-01-06 PROCEDURE — 99222 1ST HOSP IP/OBS MODERATE 55: CPT | Performed by: INTERNAL MEDICINE

## 2020-01-06 PROCEDURE — 63710000001 INSULIN LISPRO (HUMAN) PER 5 UNITS: Performed by: INTERNAL MEDICINE

## 2020-01-06 PROCEDURE — 74178 CT ABD&PLV WO CNTR FLWD CNTR: CPT

## 2020-01-06 PROCEDURE — 80061 LIPID PANEL: CPT | Performed by: INTERNAL MEDICINE

## 2020-01-06 PROCEDURE — 25010000002 DAPTOMYCIN PER 1 MG: Performed by: INTERNAL MEDICINE

## 2020-01-06 PROCEDURE — 82550 ASSAY OF CK (CPK): CPT | Performed by: INTERNAL MEDICINE

## 2020-01-06 PROCEDURE — 85027 COMPLETE CBC AUTOMATED: CPT | Performed by: INTERNAL MEDICINE

## 2020-01-06 PROCEDURE — 87040 BLOOD CULTURE FOR BACTERIA: CPT | Performed by: INTERNAL MEDICINE

## 2020-01-06 PROCEDURE — 80048 BASIC METABOLIC PNL TOTAL CA: CPT | Performed by: INTERNAL MEDICINE

## 2020-01-06 PROCEDURE — 0 IOPAMIDOL PER 1 ML: Performed by: HOSPITALIST

## 2020-01-06 PROCEDURE — 84484 ASSAY OF TROPONIN QUANT: CPT | Performed by: INTERNAL MEDICINE

## 2020-01-06 PROCEDURE — 25010000002 PIPERACILLIN SOD-TAZOBACTAM PER 1 G: Performed by: NURSE PRACTITIONER

## 2020-01-06 PROCEDURE — 82330 ASSAY OF CALCIUM: CPT | Performed by: INTERNAL MEDICINE

## 2020-01-06 PROCEDURE — 93306 TTE W/DOPPLER COMPLETE: CPT

## 2020-01-06 PROCEDURE — 63710000001 INSULIN GLARGINE PER 5 UNITS: Performed by: INTERNAL MEDICINE

## 2020-01-06 PROCEDURE — 84100 ASSAY OF PHOSPHORUS: CPT | Performed by: INTERNAL MEDICINE

## 2020-01-06 PROCEDURE — 93306 TTE W/DOPPLER COMPLETE: CPT | Performed by: INTERNAL MEDICINE

## 2020-01-06 RX ORDER — METOPROLOL TARTRATE 50 MG/1
50 TABLET, FILM COATED ORAL 3 TIMES DAILY
Status: DISCONTINUED | OUTPATIENT
Start: 2020-01-06 | End: 2020-01-08 | Stop reason: HOSPADM

## 2020-01-06 RX ADMIN — DAPTOMYCIN 450 MG: 500 INJECTION, POWDER, LYOPHILIZED, FOR SOLUTION INTRAVENOUS at 14:24

## 2020-01-06 RX ADMIN — IOPAMIDOL 100 ML: 755 INJECTION, SOLUTION INTRAVENOUS at 23:30

## 2020-01-06 RX ADMIN — INSULIN LISPRO 4 UNITS: 100 INJECTION, SOLUTION INTRAVENOUS; SUBCUTANEOUS at 14:23

## 2020-01-06 RX ADMIN — TAMSULOSIN HYDROCHLORIDE 0.4 MG: 0.4 CAPSULE ORAL at 21:14

## 2020-01-06 RX ADMIN — INSULIN LISPRO 7 UNITS: 100 INJECTION, SOLUTION INTRAVENOUS; SUBCUTANEOUS at 08:42

## 2020-01-06 RX ADMIN — ATORVASTATIN CALCIUM 40 MG: 40 TABLET, FILM COATED ORAL at 14:24

## 2020-01-06 RX ADMIN — METOPROLOL TARTRATE 50 MG: 50 TABLET, FILM COATED ORAL at 21:14

## 2020-01-06 RX ADMIN — METOPROLOL TARTRATE 50 MG: 50 TABLET, FILM COATED ORAL at 14:23

## 2020-01-06 RX ADMIN — GABAPENTIN 100 MG: 100 CAPSULE ORAL at 21:14

## 2020-01-06 RX ADMIN — Medication 10 ML: at 21:18

## 2020-01-06 RX ADMIN — Medication 10 ML: at 08:43

## 2020-01-06 RX ADMIN — HYDROCORTISONE 1 APPLICATION: 1 OINTMENT TOPICAL at 11:06

## 2020-01-06 RX ADMIN — INSULIN GLARGINE 10 UNITS: 100 INJECTION, SOLUTION SUBCUTANEOUS at 21:15

## 2020-01-06 RX ADMIN — GABAPENTIN 100 MG: 100 CAPSULE ORAL at 14:24

## 2020-01-06 RX ADMIN — ASPIRIN 325 MG ORAL TABLET 325 MG: 325 PILL ORAL at 14:24

## 2020-01-06 RX ADMIN — INSULIN LISPRO 4 UNITS: 100 INJECTION, SOLUTION INTRAVENOUS; SUBCUTANEOUS at 18:03

## 2020-01-06 RX ADMIN — INSULIN LISPRO 7 UNITS: 100 INJECTION, SOLUTION INTRAVENOUS; SUBCUTANEOUS at 21:14

## 2020-01-06 RX ADMIN — HYDROCORTISONE 1 APPLICATION: 1 OINTMENT TOPICAL at 21:18

## 2020-01-06 RX ADMIN — PIPERACILLIN AND TAZOBACTAM 3.38 G: 3; .375 INJECTION, POWDER, LYOPHILIZED, FOR SOLUTION INTRAVENOUS; PARENTERAL at 18:03

## 2020-01-06 NOTE — CONSULTS
Urology Consult Note    Patient:Artur Solano :1945  Room:[facility]  Admit Date2020  Age:74 y.o.     SEX:male     DOS:2020     MR:[id number]     Visit:[id number]       Attending: Radha Swanson MD  Referring Provider: Dr Swanson  Reason for Consultation: UTI    Patient Care Team:  Jimy Lopez MD as PCP - General (Family Medicine)    Chief complaint chest pain    Subjective .     History of present illness: 74-year-old gentleman admitted with chest pain.  There is some mild evidence for early sepsis.  White count was elevated 21,000.  Urinalysis appears infected.  Urine culture is growing E. coli though the sensitivities are pending.  Patient is well-known to our service.  He sees Dr. Pepe.  Patient has a left ureteral stricture which is managed with indwelling stent that is changed approximately every 4 months.  It is next scheduled to be changed at the end of this month.  Patient also has urinary retention that is being managed with an indwelling Cedeno catheter.  This was just changed last week.  Patient's wife is very concerned about undergoing anesthesia as he had what sounds like respiratory arrest during his last anesthetic.  Patient is currently feeling fairly well.  He denies any fevers or chills.  He denies any back or flank pain.    Review of Systems  10 point review of systems were reviewed and are negative except for:  Constitution:  positive for See HPI    History  Past Medical History:   Diagnosis Date   • Angina at rest (CMS/[facility])    • Benign prostatic disease    • Candidiasis    • Chest pain 2020   • Constipation    • Coronary artery disease    • Cutaneous abscess     right lower limb   • CVA (cerebral vascular accident) (CMS/HCC)     years ago TIA per wife   • Diabetes mellitus (CMS/[facility])     Type 2   • Essential hypertension    • GERD (gastroesophageal reflux disease)    • Hyperlipemia    • Hypoglycemia    • Iron deficiency anemia    • Peripheral vascular  disease (CMS/[facility])    • Urethral stricture    • UTI (urinary tract infection)      Past Surgical History:   Procedure Laterality Date   • ABDOMINAL AORTIC ANEURYSM REPAIR     • ABOVE KNEE LEG AMPUTATION Right    • BACK SURGERY  1975, 2008   • CARDIAC CATHETERIZATION  2005    with stent placement   • CAROTID ENDARTERECTOMY Left    • FEMORAL ENDARTERECTOMY Bilateral    • HIP PINNING Left      Social History     Socioeconomic History   • Marital status:      Spouse name: Not on file   • Number of children: Not on file   • Years of education: Not on file   • Highest education level: Not on file   Tobacco Use   • Smoking status: Former Smoker   • Tobacco comment: quit 2012   Substance and Sexual Activity   • Drug use: Never     History reviewed. No pertinent family history.  Allergy  Allergies   Allergen Reactions   • Keflex [Cephalexin] Unknown - High Severity     Unknown     • Lisinopril Unknown - High Severity     unknown     Prior to Admission medications    Medication Sig Start Date End Date Taking? Authorizing Provider   anidulafungin (ERAXIS) 100 MG injection Infuse 100 mg into a venous catheter Every Night. 34 total days; last day Wednesday, 1/8/20 1/8/20 Yes Terri Machado MD   aspirin 325 MG tablet Take 325 mg by mouth Daily.   Yes ProviderTerri MD   atorvastatin (LIPITOR) 40 MG tablet Take 40 mg by mouth Daily.   Yes ProviderTerri MD   Cranberry-Vitamin C-Probiotic (AZO CRANBERRY) 250-30 MG tablet Take 1 tablet by mouth Daily As Needed (bladder spasms).   Yes Terri Machado MD   famotidine (PEPCID) 20 MG tablet Take 20 mg by mouth 2 (Two) Times a Day As Needed.   Yes Terri Machado MD   gabapentin (NEURONTIN) 100 MG capsule Take 100 mg by mouth 2 (Two) Times a Day. 12/4/19  Yes Terri Machado MD   glipizide (GLUCOTROL) 10 MG tablet Take 10 mg by mouth 2 (Two) Times a Day Before Meals.   Yes Terri Machado MD   hydrocortisone 1 % ointment Apply 1  application topically to the appropriate area as directed 2 (Two) Times a Day As Needed.   Yes Terri Machado MD   insulin detemir (LEVEMIR) 100 UNIT/ML injection Inject 20 Units under the skin into the appropriate area as directed Every Morning.   Yes Terri Machado MD   ipratropium-albuterol (DUO-NEB) 0.5-2.5 mg/3 ml nebulizer Take 3 mL by nebulization 3 (Three) Times a Day As Needed for Wheezing.   Yes Terri Machado MD   metFORMIN (GLUCOPHAGE) 1000 MG tablet Take 1,000 mg by mouth 2 (Two) Times a Day With Meals.   Yes Terri Machado MD   metoprolol tartrate (LOPRESSOR) 50 MG tablet Take 50 mg by mouth 2 (Two) Times a Day.   Yes Terri Machado MD   nitroglycerin (NITROSTAT) 0.4 MG SL tablet Place 0.4 mg under the tongue Every 5 (Five) Minutes As Needed for Chest Pain. Take no more than 3 doses in 15 minutes.   Yes Terri Machado MD   SITagliptin (JANUVIA) 100 MG tablet Take 100 mg by mouth Every Night.   Yes Terri Machado MD   tamsulosin (FLOMAX) 0.4 MG capsule 24 hr capsule Take 0.4 mg by mouth Daily.   Yes Terri Machado MD         Objective     tMax 24 hours:  Temp (24hrs), Av °F (36.7 °C), Min:97.5 °F (36.4 °C), Max:98.9 °F (37.2 °C)    Vital Sign Ranges:  Temp:  [97.5 °F (36.4 °C)-98.9 °F (37.2 °C)] 98.6 °F (37 °C)  Heart Rate:  [105-115] 108  Resp:  [12-20] 12  BP: (103-134)/(50-89) 134/89  Intake and Output Last 3 Shifts:  I/O last 3 completed shifts:  In: 1250 [I.V.:250; IV Piggyback:1000]  Out: 400 [Urine:400]      Physical Exam:   General Appearance alert, appears stated age and cooperative  Head normocephalic, without obvious abnormality and atraumatic  Eyes lids and lashes normal, conjunctivae and sclerae normal, no icterus, no pallor and corneas clear  Lungs respirations regular, respirations even and respirations unlabored  Heart regular rhythm & normal rate  Abdomen soft non-tender, no guarding and no rebound tenderness  Male Genitalia  Cedeno with yellow urine  Skin no bleeding, bruising or rash  Neurologic Mental Status orientated to person, place, time and situation    Results Review:     Lab Results (last 24 hours)     Procedure Component Value Units Date/Time    POC Glucose Once [[id number]]  (Abnormal) Collected:  01/06/20 1133    Specimen:  Blood Updated:  01/06/20 1213     Glucose 263 mg/dL      Comment: Serial Number: [id number]:  [id number]       CK [[id number]]  (Normal) Collected:  01/06/20 1011    Specimen:  Blood Updated:  01/06/20 1116     Creatine Kinase 102 U/L     Urine Culture - Urine, Urine, Catheter [[id number]]  (Abnormal) Collected:  01/05/20 1436    Specimen:  Urine, Catheter Updated:  01/06/20 1048     Urine Culture >100,000 CFU/mL Escherichia coli    Blood Culture - Blood, Blood, Venous Line [[id number]] Collected:  01/06/20 1011    Specimen:  Blood, Venous Line Updated:  01/06/20 1022    Lipid Panel [[id number]]  (Abnormal) Collected:  01/06/20 0613    Specimen:  Blood Updated:  01/06/20 0954     Total Cholesterol 121 mg/dL      Triglycerides 78 mg/dL      HDL Cholesterol 34 mg/dL      LDL Cholesterol  71 mg/dL      VLDL Cholesterol 15.6 mg/dL      LDL/HDL Ratio 2.10    Narrative:       Cholesterol Reference Ranges  (U.S. Department of Health and Human Services ATP III Classifications)    Desirable          <200 mg/dL  Borderline High    200-239 mg/dL  High Risk          >240 mg/dL      Triglyceride Reference Ranges  (U.S. Department of Health and Human Services ATP III Classifications)    Normal           <150 mg/dL  Borderline High  150-199 mg/dL  High             200-499 mg/dL  Very High        >500 mg/dL    HDL Reference Ranges  (U.S. Department of Health and Human Services ATP III Classifcations)    Low     <40 mg/dl (major risk factor for CHD)  High    >60 mg/dl ('negative' risk factor for CHD)        LDL Reference Ranges  (U.S. Department of Health and Human Services ATP III Classifcations)    Optimal          <100  mg/dL  Near Optimal     100-129 mg/dL  Borderline High  130-159 mg/dL  High             160-189 mg/dL  Very High        >189 mg/dL    Troponin [[id number]]  (Abnormal) Collected:  01/06/20 0613    Specimen:  Blood Updated:  01/06/20 0905     Troponin T 0.463 ng/mL     Narrative:       Troponin T Reference Range:  <= 0.03 ng/mL-   Negative for AMI  >0.03 ng/mL-     Abnormal for myocardial necrosis.  Clinicians would have to utilize clinical acumen, EKG, Troponin and serial changes to determine if it is an Acute Myocardial Infarction or myocardial injury due to an underlying chronic condition.     Basic Metabolic Panel [[id number]]  (Abnormal) Collected:  01/06/20 0613    Specimen:  Blood Updated:  01/06/20 0843     Glucose 380 mg/dL      BUN 37 mg/dL      Creatinine 0.73 mg/dL      Sodium 130 mmol/L      Potassium 5.2 mmol/L      Chloride 95 mmol/L      CO2 21.0 mmol/L      Calcium 9.2 mg/dL      eGFR Non African Amer 105 mL/min/1.73      BUN/Creatinine Ratio 50.7     Anion Gap 14.0 mmol/L     Narrative:       GFR Normal >60  Chronic Kidney Disease <60  Kidney Failure <15      CK [[id number]]  (Normal) Collected:  01/06/20 0613    Specimen:  Blood Updated:  01/06/20 0842     Creatine Kinase 94 U/L     Magnesium [[id number]]  (Abnormal) Collected:  01/06/20 0613    Specimen:  Blood Updated:  01/06/20 0842     Magnesium 2.5 mg/dL     Phosphorus [[id number]]  (Normal) Collected:  01/06/20 0613    Specimen:  Blood Updated:  01/06/20 0842     Phosphorus 4.3 mg/dL     TSH [[id number]]  (Normal) Collected:  01/06/20 0613    Specimen:  Blood Updated:  01/06/20 0838     TSH 3.990 uIU/mL     Calcium, Ionized [[id number]]  (Normal) Collected:  01/06/20 0613    Specimen:  Blood Updated:  01/06/20 0816     Ionized Calcium 1.27 mmol/L     POC Glucose Once [[id number]]  (Abnormal) Collected:  01/06/20 0750    Specimen:  Blood Updated:  01/06/20 0752     Glucose 305 mg/dL      Comment: Serial Number: [id number]:  [id number]        Blood Culture - Blood, Hand, Left [[id number]]  (Abnormal) Collected:  01/05/20 1430    Specimen:  Blood from Hand, Left Updated:  01/06/20 0734     Blood Culture Abnormal Stain     Gram Stain Anaerobic Bottle Gram positive cocci in chains      Aerobic Bottle Gram positive cocci in chains    Blood Culture ID, PCR - Blood, Arm, Left [[id number]]  (Abnormal) Collected:  01/05/20 1421    Specimen:  Blood from Arm, Left Updated:  01/06/20 0618     BCID, PCR Enterococcus spp. Identification by BCID PCR.     BOTTLE TYPE Aerobic Bottle    CBC & Differential [[id number]] Collected:  01/06/20 0606    Specimen:  Blood Updated:  01/06/20 0617    Narrative:       The following orders were created for panel order CBC & Differential.  Procedure                               Abnormality         Status                     ---------                               -----------         ------                     Manual Differential[[id number]]                                                         CBC Auto Differential[[id number]]        Abnormal            Final result                 Please view results for these tests on the individual orders.    CBC Auto Differential [[id number]]  (Abnormal) Collected:  01/06/20 0606    Specimen:  Blood Updated:  01/06/20 0617     WBC 14.00 10*3/mm3      RBC 3.71 10*6/mm3      Hemoglobin 9.7 g/dL      Hematocrit 29.7 %      MCV 79.8 fL      MCH 26.2 pg      MCHC 32.9 g/dL      RDW 20.7 %      RDW-SD 58.2 fl      MPV 6.4 fL      Platelets 468 10*3/mm3      Neutrophil % 80.9 %      Lymphocyte % 7.8 %      Monocyte % 10.7 %      Eosinophil % 0.1 %      Basophil % 0.5 %      Neutrophils, Absolute 11.30 10*3/mm3      Lymphocytes, Absolute 1.10 10*3/mm3      Monocytes, Absolute 1.50 10*3/mm3      Eosinophils, Absolute 0.00 10*3/mm3      Basophils, Absolute 0.10 10*3/mm3      nRBC 0.0 /100 WBC     Blood Culture - Blood, Arm, Left [[id number]]  (Abnormal) Collected:  01/05/20 1421    Specimen:  Blood from Arm,  Left Updated:  01/06/20 0359     Blood Culture Abnormal Stain     Gram Stain Aerobic Bottle Gram positive cocci in chains      Anaerobic Bottle Gram positive cocci in chains    POC Glucose Once [[id number]]  (Abnormal) Collected:  01/05/20 2147    Specimen:  Blood Updated:  01/05/20 2148     Glucose 388 mg/dL      Comment: Serial Number: [id number]:  [id number]       Troponin [[id number]]  (Abnormal) Collected:  01/05/20 2051    Specimen:  Blood Updated:  01/05/20 2117     Troponin T 0.296 ng/mL     Narrative:       Troponin T Reference Range:  <= 0.03 ng/mL-   Negative for AMI  >0.03 ng/mL-     Abnormal for myocardial necrosis.  Clinicians would have to utilize clinical acumen, EKG, Troponin and serial changes to determine if it is an Acute Myocardial Infarction or myocardial injury due to an underlying chronic condition.     BNP [[id number]]  (Normal) Collected:  01/05/20 2051    Specimen:  Blood Updated:  01/05/20 2114     proBNP 810.6 pg/mL     Narrative:       Among patients with dyspnea, NT-proBNP is highly sensitive for the detection of acute congestive heart failure. In addition NT-proBNP of <300 pg/ml effectively rules out acute congestive heart failure with 99% negative predictive value.      Lactic Acid, Reflex [[id number]]  (Abnormal) Collected:  01/05/20 1742    Specimen:  Blood Updated:  01/05/20 1816     Lactate 3.3 mmol/L     Lactic Acid, Reflex Timer (This will reflex a repeat order 3-3:15 hours after ordered.) [[id number]] Collected:  01/05/20 1421    Specimen:  Blood Updated:  01/05/20 1730     Extra Tube Hold for add-ons.     Comment: Auto resulted.              Urine Culture   Date Value Ref Range Status   01/05/2020 >100,000 CFU/mL Escherichia coli (A)  Preliminary        Imaging Results (Last 7 Days)     Procedure Component Value Units Date/Time    XR Chest 1 View [[id number]] Collected:  01/05/20 1309     Updated:  01/05/20 1312    Narrative:       DATE OF EXAM:  1/5/2020 12:59 PM        PROCEDURE:  XR CHEST 1 VW-     INDICATIONS:  Chest pain, past tobacco abuse.      COMPARISON:  No Comparisons Available     TECHNIQUE:   Single radiographic view of the chest was obtained.     FINDINGS:  The heart size is normal. The lung volumes are diminished. The pulmonary  vascular markings are felt to be normal. The lungs and pleural spaces  are clear of active disease. There is a left upper extremity PICC line  in place with the tip terminating in superior vena cava.  There are  chronic age-related changes involving the bony thorax and thoracic  aorta. There is fusion hardware seen within the visualized lower  thoracic spine.       Impression:          1. Low lung volumes.  2. No active pulmonary disease.     Electronically Signed By-John Chavira On:1/5/2020 1:10 PM  This report was finalized on [id number] by  John Chavira, .          Inpatient Meds:   Scheduled Meds:  aspirin 325 mg Oral Daily   atorvastatin 40 mg Oral Daily   DAPTOmycin 6 mg/kg Intravenous Q24H   gabapentin 100 mg Oral BID   hydrocortisone 1 application Topical Q12H   insulin glargine 10 Units Subcutaneous Nightly   insulin lispro 0-9 Units Subcutaneous 4x Daily With Meals & Nightly   metoprolol tartrate 50 mg Oral Q12H   micafungin (MYCAMINE)  mg Intravenous Q24H   pantoprazole 40 mg Oral QAM   sodium chloride 10 mL Intravenous Q12H   tamsulosin 0.4 mg Oral Nightly      Continuous Infusions:    PRN Meds:.dextrose  •  dextrose  •  docusate sodium  •  glucagon (human recombinant)  •  HYDROcodone-acetaminophen  •  insulin lispro **AND** insulin lispro  •  ipratropium-albuterol  •  ketamine (KETALAR) infusion **AND** Ketamine Vital Signs & Assessment  •  nitroglycerin  •  ondansetron **OR** ondansetron  •  [COMPLETED] Insert peripheral IV **AND** sodium chloride  •  sodium chloride      Assessment/Plan     Active Problems:    Chest pain    Diabetes mellitus (CMS/HCC)    Candidiasis    Hyperlipemia    Essential hypertension    Peripheral  vascular disease (CMS/HCC)    GERD (gastroesophageal reflux disease)    Coronary artery disease    Benign prostatic disease    Urinary tract infection with E. coli  Bacteremia with enterococcus  Left ureteral stricture managed with a stent  Urinary retention managed with a Cedeno catheter    Plan  Await sensitivities  Will obtain CT scan abdomen pelvis with and without IV contrast to evaluate the kidneys for evidence of pyelonephritis or obstruction        I discussed the patients findings and my recommendations with patient and family    Thank you for this  consult    Matti Miller MD  01/06/20  4:19 PM

## 2020-01-06 NOTE — CONSULTS
Infectious Diseases Consult Note    Referring Provider: Dr. Pickard    Reason for Consultation: Bacteremia, UTI    Patient Care Team:  Jimy Lopez MD as PCP - General (Family Medicine)    Chief complaint chest pain    Subjective     The patient has been afebrile for the last 24 hours.  The patient is on room air, hemodynamically stable, and is tolerating antimicrobial therapy.    History of present illness:      This is a 74-year-old male who presents to the hospital on 1/5/2020 with complaints of chest pain.  Patient has been at rehab following several surgeries to his right stump at [facility].  Patient seems fairly confused and is not a good historian, but his wife is at bedside and able to give a fairly good history.  Patient denies fever, chills, diaphoresis, has had some shortness of breath off and on, but denies a productive cough.  Patient denies abdominal pain or any GI symptoms denies urinary symptoms although he has had a Cedeno catheter for some time.  Patient states his main complaint is chest pain which is worse when he breathes deep, cough, will subside aside.  Patient is also had some headaches.    Patient is currently on room air and does not appear to be in any acute distress.  Patient has a right above-the-knee stump but has an incision at the base of the stump that appears to be healing well.  There also is a healing incision in the right groin but does not show any overt signs of infection.  Patient also has 3 ulcers on his left foot-none of which appear to be grossly infected.  Patient has very difficult pulses to palpate in his left foot.  The patient has been afebrile since admission.  Patient has a creatinine of 0.73, lactic of 3.3, white blood cell count of 14 down from 21.1, hemoglobin 9.7, platelets of 468.  Patient has a urine culture that is growing E. coli.  Blood cultures are growing enterococcus and flu screen was negative.  A chest x-ray was negative for any acute  findings.  Patient was on IV cefepime micafungin and we started IV daptomycin earlier today.  Patient has allergies to Keflex.    According to the patient's wife the patient had 3 surgeries in November 2019 to deal with right thumb infection, but he coded during the last surgery.  Patient had a an excision of the right limb of aortofemoral bypass repair the previous vein bypass on 11/26/2019.  At that time the patient did have a urine culture positive for enterococcus.  . Records also indicated that the patient  ALICE  but refuses to use his CPAP.  There is a mention of a prostate carcinoma in 2017.  And also mention of a subdural hematoma in July 2019.  There is mention that the patient has a fungal infection and that we will be on antifungal treatment until 1/7/2020.  Wound cultures from 11/26/2019 shows no fungal infection but they do show Staphylococcus epidermidis and Staphylococcus warneri. Blood cultures from 11/22/2019 did not show any fungal growth.. Unfortunately I am not finding any records of any fungal cultures in the care everywhere records  Patient has a past medical history significant for angina, , Benign prostatic disease, chest pain type 2 diabetes, hyperlipidemia, hypertension, peripheral vascular disease, GERD, CAD, anemia, constipation, urethral stricture, CVA, abdominal aortic aneurysm repair, back surgery, cardiac catheterization with stent, carotid enterectomy, femoral enterectomy, and hip pinning.  Patient is a former smoker and denies any illicit drug abuse or alcohol abuse.        Review of Systems   Review of Systems   Constitutional: Negative.    Respiratory: Positive for shortness of breath.    Cardiovascular: Positive for chest pain.   Gastrointestinal: Negative.    Musculoskeletal: Positive for arthralgias.   Skin: Positive for wound.   Neurological: Positive for headaches.   Psychiatric/Behavioral: Negative.    All other systems reviewed and are negative.      Medications  Medications  Prior to Admission   Medication Sig Dispense Refill Last Dose   • anidulafungin (ERAXIS) 100 MG injection Infuse 100 mg into a venous catheter Every Night. 34 total days; last day Wednesday, 1/8/20      • aspirin 325 MG tablet Take 325 mg by mouth Daily.      • atorvastatin (LIPITOR) 40 MG tablet Take 40 mg by mouth Daily.      • Cranberry-Vitamin C-Probiotic (AZO CRANBERRY) 250-30 MG tablet Take 1 tablet by mouth Daily As Needed (bladder spasms).      • famotidine (PEPCID) 20 MG tablet Take 20 mg by mouth 2 (Two) Times a Day As Needed.      • gabapentin (NEURONTIN) 100 MG capsule Take 100 mg by mouth 2 (Two) Times a Day.      • glipizide (GLUCOTROL) 10 MG tablet Take 10 mg by mouth 2 (Two) Times a Day Before Meals.      • hydrocortisone 1 % ointment Apply 1 application topically to the appropriate area as directed 2 (Two) Times a Day As Needed.      • insulin detemir (LEVEMIR) 100 UNIT/ML injection Inject 20 Units under the skin into the appropriate area as directed Every Morning.      • ipratropium-albuterol (DUO-NEB) 0.5-2.5 mg/3 ml nebulizer Take 3 mL by nebulization 3 (Three) Times a Day As Needed for Wheezing.      • metFORMIN (GLUCOPHAGE) 1000 MG tablet Take 1,000 mg by mouth 2 (Two) Times a Day With Meals.      • metoprolol tartrate (LOPRESSOR) 50 MG tablet Take 50 mg by mouth 2 (Two) Times a Day.      • nitroglycerin (NITROSTAT) 0.4 MG SL tablet Place 0.4 mg under the tongue Every 5 (Five) Minutes As Needed for Chest Pain. Take no more than 3 doses in 15 minutes.      • SITagliptin (JANUVIA) 100 MG tablet Take 100 mg by mouth Every Night.      • tamsulosin (FLOMAX) 0.4 MG capsule 24 hr capsule Take 0.4 mg by mouth Daily.            Past Medical History:   Diagnosis Date   • Angina at rest (CMS/[facility])    • Benign prostatic disease    • Candidiasis    • Chest pain 01/05/2020   • Constipation    • Coronary artery disease    • Cutaneous abscess     right lower limb   • CVA (cerebral vascular accident) (CMS/[facility])       years ago TIA per wife   • Diabetes mellitus (CMS/[facility])     Type 2   • Essential hypertension    • GERD (gastroesophageal reflux disease)    • Hyperlipemia    • Hypoglycemia    • Iron deficiency anemia    • Peripheral vascular disease (CMS/HCC)    • Urethral stricture    • UTI (urinary tract infection)      Past Surgical History:   Procedure Laterality Date   • ABDOMINAL AORTIC ANEURYSM REPAIR     • ABOVE KNEE LEG AMPUTATION Right    • BACK SURGERY  1975, 2008   • CARDIAC CATHETERIZATION  2005    with stent placement   • CAROTID ENDARTERECTOMY Left    • FEMORAL ENDARTERECTOMY Bilateral    • HIP PINNING Left        Family History  History reviewed. No pertinent family history.    Social History   reports that he has quit smoking. He does not have any smokeless tobacco history on file. He reports that he does not use drugs.    Allergies  Keflex [cephalexin] and Lisinopril    Objective     Vital Signs   Vital Signs (last 24 hours)       01/05 0700  -  01/06 0659 01/06 0700  -  01/06 1113   Most Recent    Temp (°F) 97.5 -  99.3       97.5 (36.4)    Heart Rate 105 -  120       105    Resp 16 -  36       18    BP 84/58 -  124/87       117/69    SpO2 (%) 94 -  100       94          Physical Exam:  Physical Exam   Constitutional: He appears well-developed.   Appears thin and chronically ill   HENT:   Head: Normocephalic and atraumatic.   Eyes: Pupils are equal, round, and reactive to light. Conjunctivae and EOM are normal.   Neck: Neck supple.   Cardiovascular: Normal rate, regular rhythm and normal heart sounds.   Pulmonary/Chest: Effort normal and breath sounds normal.   Abdominal: Soft. Bowel sounds are normal.   Genitourinary:   Genitourinary Comments: Cedeno catheter in place   Musculoskeletal:   Degenerative changes with a right AKA   Neurological: He is alert.   Alert and oriented x2 but appears somewhat confused   Skin: Skin is warm and dry.    right above-the-knee stump but has an incision at the base of the stump  that appears to be healing well.  There also is a healing incision in the right groin but does not show any overt signs of infection.  Patient also has 3 ulcers on his left foot-none of which appear to be grossly infected.  Patient has very difficult pulses to palpate in his left foot.    Psychiatric: He has a normal mood and affect.   Vitals reviewed.      Microbiology  Microbiology Results (last 10 days)     Procedure Component Value - Date/Time    Urine Culture - Urine, Urine, Catheter [[id number]]  (Abnormal) Collected:  01/05/20 1436    Lab Status:  Preliminary result Specimen:  Urine, Catheter Updated:  01/06/20 1048     Urine Culture >100,000 CFU/mL Escherichia coli    Blood Culture - Blood, Hand, Left [[id number]]  (Abnormal) Collected:  01/05/20 1430    Lab Status:  Preliminary result Specimen:  Blood from Hand, Left Updated:  01/06/20 0734     Blood Culture Abnormal Stain     Gram Stain Anaerobic Bottle Gram positive cocci in chains      Aerobic Bottle Gram positive cocci in chains    Influenza Antigen, Rapid - Swab, Nasopharynx [[id number]]  (Normal) Collected:  01/05/20 1422    Lab Status:  Final result Specimen:  Swab from Nasopharynx Updated:  01/05/20 1445     Influenza A PCR Not Detected     Influenza B PCR Not Detected    Blood Culture - Blood, Arm, Left [[id number]]  (Abnormal) Collected:  01/05/20 1421    Lab Status:  Preliminary result Specimen:  Blood from Arm, Left Updated:  01/06/20 0359     Blood Culture Abnormal Stain     Gram Stain Aerobic Bottle Gram positive cocci in chains      Anaerobic Bottle Gram positive cocci in chains    Blood Culture ID, PCR - Blood, Arm, Left [[id number]]  (Abnormal) Collected:  01/05/20 1421    Lab Status:  Final result Specimen:  Blood from Arm, Left Updated:  01/06/20 0618     BCID, PCR Enterococcus spp. Identification by BCID PCR.     BOTTLE TYPE Aerobic Bottle          Laboratory  Results from last 7 days   Lab Units 01/06/20  0606   WBC 10*3/mm3 14.00*      HEMOGLOBIN g/dL 9.7*   HEMATOCRIT % 29.7*   PLATELETS 10*3/mm3 468*     Results from last 7 days   Lab Units 01/06/20  0613   SODIUM mmol/L 130*   POTASSIUM mmol/L 5.2   CHLORIDE mmol/L 95*   CO2 mmol/L 21.0*   BUN mg/dL 37*   CREATININE mg/dL 0.73*   GLUCOSE mg/dL 380*   CALCIUM mg/dL 9.2     Results from last 7 days   Lab Units 01/06/20  0613   SODIUM mmol/L 130*   POTASSIUM mmol/L 5.2   CHLORIDE mmol/L 95*   CO2 mmol/L 21.0*   BUN mg/dL 37*   CREATININE mg/dL 0.73*   GLUCOSE mg/dL 380*   CALCIUM mg/dL 9.2     Results from last 7 days   Lab Units 01/06/20  1011   CK TOTAL U/L 102               Radiology  Imaging Results (Last 72 Hours)     Procedure Component Value Units Date/Time    XR Chest 1 View [[id number]] Collected:  01/05/20 1309     Updated:  01/05/20 1312    Narrative:       DATE OF EXAM:  1/5/2020 12:59 PM     PROCEDURE:  XR CHEST 1 VW-     INDICATIONS:  Chest pain, past tobacco abuse.      COMPARISON:  No Comparisons Available     TECHNIQUE:   Single radiographic view of the chest was obtained.     FINDINGS:  The heart size is normal. The lung volumes are diminished. The pulmonary  vascular markings are felt to be normal. The lungs and pleural spaces  are clear of active disease. There is a left upper extremity PICC line  in place with the tip terminating in superior vena cava.  There are  chronic age-related changes involving the bony thorax and thoracic  aorta. There is fusion hardware seen within the visualized lower  thoracic spine.       Impression:          1. Low lung volumes.  2. No active pulmonary disease.     Electronically Signed By-John Chavira On:1/5/2020 1:10 PM  This report was finalized on [id number] by  John Chavira, .          Cardiology      Results Review:  I have reviewed all clinical data, test, lab, and imaging results.       Schedule Meds    aspirin 325 mg Oral Daily   atorvastatin 40 mg Oral Daily   DAPTOmycin 6 mg/kg Intravenous Q24H   gabapentin 100 mg Oral BID    hydrocortisone 1 application Topical Q12H   insulin glargine 10 Units Subcutaneous Nightly   insulin lispro 0-9 Units Subcutaneous 4x Daily With Meals & Nightly   metoprolol tartrate 50 mg Oral TID   pantoprazole 40 mg Oral QAM   [START ON 1/7/2020] piperacillin-tazobactam 3.375 g Intravenous Q8H   sodium chloride 10 mL Intravenous Q12H   tamsulosin 0.4 mg Oral Nightly       Infusion Meds       PRN Meds  dextrose  •  dextrose  •  docusate sodium  •  glucagon (human recombinant)  •  HYDROcodone-acetaminophen  •  insulin lispro **AND** insulin lispro  •  ipratropium-albuterol  •  ketamine (KETALAR) infusion **AND** Ketamine Vital Signs & Assessment  •  nitroglycerin  •  ondansetron **OR** ondansetron  •  [COMPLETED] Insert peripheral IV **AND** sodium chloride  •  sodium chloride      Assessment/Plan       Assessment    Bacteremia-cultures growing enterococcus  -Need to rule out intra-abdominal source especially since patient has a vascular graft  -Looking over the records from [place] it appears that the patient had a UTI with enterococcus during in November 2019 admission    Urinary tract infection-cultures are growing E. Coli  -Patient has had a Cedeno catheter in due to a recent groin surgery    History of 3 recent surgeries on the patient's right stump due to infection with fungus  -The original  AKA was in 2017 due to a vascular graft occluding  -Patient's left surgery was on 11/26/2019-cultures from that surgery appear to be negative for any fungal infections and blood cultures from that.  Also are negative for any fungal infection  -However cultures did show growth of Staphylococcus epidermidis and Staphylococcus warneri  -Patient was supposed to be on antifungal treatment until 1/7/2020  Patient's current wounds including an healing incision on the right stump-, and again the incision on the right  Do not show any overt signs of infection    Patient has 3 ulcers on his left foot that do not show any signs of  overt infection     Complains of chest pain that is especially with deep breathing, coughing, and movement    Confusion  -Could be related to infection or use of IV cefepime    History of multiple vascular surgeries including repair of a aortic aneurysm     History of CAD with cardiac catheterization in 2005     History of prostate cancer    History of subdural hematoma in 7/2019    Plan    Patient started on IV daptomycin earlier today   Discontinue IV cefepime-can contribute to confusion  Start IV Zosyn 3.375 g every 8 hours  Discontinue IV micafungin  CT stone protocol  Waiting on blood and urine cultures to finalize  Obtain cultures from [facility]  Continue supportive care  Kurt Hopkins, APRN  01/06/20  7:55 PM

## 2020-01-06 NOTE — CONSULTS
Cardiology Consult Note      Requesting Physician    Radha Swanson MD    PATIENT IDENTIFICATION    Name: Artur Solano Age: 74 y.o. Sex: male :  1945 MRN: [id number]       Cardiology assessment and plan  Chest pain atypical  Coronary artery disease  Ischemic cardiomyopathy  Echocardiogram with estimated LV ejection fraction of 35%  EKG with sinus tachycardia incomplete left bundle branch block and ST-T wave changes in the inferolateral leads  Mild nonspecific elevation of troponin  Sepsis  Hypertension  Peripheral vascular disease  Hyperlipidemia  History of prostate cancer  Frail status with multiple comorbidities  Enterococcal bacteremia  Urinary tract infection with E. Coli  Multiple peripheral vascular surgeries  Recent cardiac catheterization at [facility] with significant disease involving the PLB branch of the right coronary artery because of the small size of the vessel currently being medically managed    Test results and treatment options discussed with the patient and family  We will review the medical records from recent hospitalization at [facility]  Plan to manage patient conservatively from cardiac standpoint at this time  Further recommendations based on patient hospital course    Interpretation Summary     · The left ventricular cavity is mild-to-moderately dilated.  · Left ventricular wall thickness is consistent with mild concentric hypertrophy.  · The following left ventricular wall segments are hypokinetic: mid anterior, apical anterior, apical septal, mid inferoseptal, apex hypokinetic, mid anteroseptal and basal anterior. The following left ventricular wall segments are akinetic: basal anterolateral, mid anterolateral, apical lateral, basal inferolateral, mid inferolateral, apical inferior, basal inferior, mid inferior and basal inferoseptal.  · There is a small (<1cm) pericardial effusion.  · Estimated EF = 35%.  · Left ventricular diastolic dysfunction (grade I)  consistent with impaired relaxation.  · Left atrial cavity size is mild-to-moderately dilated.  · Mild mitral valve regurgitation is present  · Mild tricuspid valve regurgitation is present.           REASON FOR CONSULTATION:  74-year-old  male with known history of coronary artery disease, severe peripheral arterial disease, history of left renal stent, recent infected aorta bifemoral bypass graft.  History of latent autoimmune diabetes 2, hypertension, history of multiple lacunar infarcts, history of myocardial infarction, history of right above-the-knee amputation.  History of prostate cancer, sleep apnea with noncompliance of CPAP, dyslipidemia and arthritis.      Was discharged from [facility] 12/4/2019 for infection of vascular bypass graft.  He was transferred to [place] from [facility] for possible vascular surgery on 11/21/2019.  He was found to have elevated troponin and cardiology was consulted.  He underwent heart catheterization that showed occluded circumflex artery, significant disease in the small posterior lateral branch of RCA which was treated medically.  Medical management recommended by cardiology.  He underwent excision of the right limb of aortobifemoral bypass, excision previous vein bypass and ligation femoral artery.  He was discharged to rehab facility for continued IV antibiotic therapy.      2D echocardiogram performed while at [facility]:  Technically difficult examination.    The ejection fraction biplane was calculated at 51%.    There is inferior wall hypokinesis noted.    Overall left ventricular function is low normal.    Impaired relaxation compatible with diastolic dysfunction.    Mildly dilated left atrium.    Mild aortic stenosis is present.    Mild to moderate mitral regurgitation is present.    Trace tricuspid regurgitation.      CC  Chest pain        HISTORY OF PRESENT ILLNESS:   Patient presented to the emergency department from rehab facility  "with report of chest pain that occurred after undergoing treatment/dressing change for his right groin wound VAC 3 days ago.  He states the pain resolved with 1 sublingual nitroglycerin and returned the day of admission.  The pain is in the center of his chest and radiates to his neck.   The pain is worse with deep inspiration.  There is no chest wall tenderness with palpation.  The discomfort returned yesterday and EMS was notified for transport to the ED.  He was given 2 sublingual nitroglycerin per EMS with no relief of his discomfort this time.  The wife states just prior to transport he was having fluctuations in his O2 sat from 70s to 90s.  Patient states his discomfort is improved with p.o. [place].    Pertinent labs include troponin 0.039, 0.296, 0.463.  proBNP elevated at 1272 on admission, 810.6 today.  Patient reports his chest discomfort is 7/10 upon my evaluation, however he does seem very comfortable at this time.  WBC 14.  Urine with large blood, 1+ bacteria.  Creatinine 0.73.      REVIEW OF SYSTEMS:  Pertinent items are noted in HPI, all other systems reviewed and negative  Complaining of atypical chest pain with deep inspiration and coughing  Denies any nausea vomiting diarrhea no hematemesis no melena no abdominal pain no dizziness no syncope  OBJECTIVE       ASSESSMENT/PLAN    Chest pain    Diabetes mellitus (CMS/HCC)    Candidiasis    Hyperlipemia    Essential hypertension    Peripheral vascular disease (CMS/HCC)    GERD (gastroesophageal reflux disease)    Coronary artery disease    Benign prostatic disease            Vital Signs  Visit Vitals  /69   Pulse 105   Temp 97.5 °F (36.4 °C) (Oral)   Resp 18   Ht 180.3 cm (71\")   Wt 70.9 kg (156 lb 4.9 oz)   SpO2 94%   BMI 21.80 kg/m²     Oxygen Therapy  SpO2: 94 %  Pulse Oximetry Type: Intermittent  Device (Oxygen Therapy): room air  Flowsheet Rows      First Filed Value   Admission Height  180.3 cm (71\") Documented at 01/05/2020 1145   Admission " Weight  70.3 kg (155 lb) Documented at 01/05/2020 1145        Intake & Output (last 3 days)       01/03 0701 - 01/04 0700 01/04 0701 - 01/05 0700 01/05 0701 - 01/06 0700 01/06 0701 - 01/07 0700    I.V. (mL/kg)   250 (3.5)     IV Piggyback   1000     Total Intake(mL/kg)   1250 (17.6)     Urine (mL/kg/hr)   400     Stool   0     Total Output   400     Net   +850             Stool Unmeasured Occurrence   1 x         Lines, Drains & Airways    Active LDAs     Name:   Placement date:   Placement time:   Site:   Days:    PICC Single Lumen 01/05/20 Left   01/05/20    1155    --   less than 1    Urethral Catheter   01/05/20 PRESENT UPON ARRIVAL     1157     less than 1                Medical History    Past Medical History:   Diagnosis Date   • Angina at rest (CMS/[facility])    • Benign prostatic disease    • Candidiasis    • Chest pain 01/05/2020   • Constipation    • Coronary artery disease    • Cutaneous abscess     right lower limb   • CVA (cerebral vascular accident) (CMS/Formerly [facility])     years ago TIA per wife   • Diabetes mellitus (CMS/[facility])     Type 2   • Essential hypertension    • GERD (gastroesophageal reflux disease)    • Hyperlipemia    • Hypoglycemia    • Iron deficiency anemia    • Peripheral vascular disease (CMS/[facility])    • Urethral stricture    • UTI (urinary tract infection)         Surgical History    Past Surgical History:   Procedure Laterality Date   • ABDOMINAL AORTIC ANEURYSM REPAIR     • ABOVE KNEE LEG AMPUTATION Right    • BACK SURGERY  1975, 2008   • CARDIAC CATHETERIZATION  2005    with stent placement   • CAROTID ENDARTERECTOMY Left    • FEMORAL ENDARTERECTOMY Bilateral    • HIP PINNING Left         Family History    History reviewed. No pertinent family history.    Social History    Social History     Tobacco Use   • Smoking status: Former Smoker   • Tobacco comment: quit 2012   Substance Use Topics   • Alcohol use: Not on file        Allergies    Allergies   Allergen Reactions   • Keflex [Cephalexin] Unknown  "- High Severity     Unknown     • Lisinopril Unknown - High Severity     unknown              /69   Pulse 105   Temp 97.5 °F (36.4 °C) (Oral)   Resp 18   Ht 180.3 cm (71\")   Wt 70.9 kg (156 lb 4.9 oz)   SpO2 94%   BMI 21.80 kg/m²   Intake/Output last 3 shifts:  I/O last 3 completed shifts:  In: 1250 [I.V.:250; IV Piggyback:1000]  Out: 400 [Urine:400]  Intake/Output this shift:  No intake/output data recorded.    PHYSICAL EXAM:    General: Well-developed, chronically ill-appearing 74-year-old  male who is alert, cooperative, no distress, appears stated age  Head:  Normocephalic, atraumatic, mucous membranes moist  Eyes:  Conjunctiva/corneas clear, EOM's intact     Neck:  Supple,  no adenopathy;      Lungs: Clear to auscultation bilaterally, no wheezes rhonchi rales are noted  Chest wall: No tenderness  Heart::  Regular rate and rhythm, S1 and S2 normal, displaced LV apical impulse  Abdomen: Soft, non-tender, nondistended bowel sounds active  Extremities: Right above-the-knee amputation, left lower extremity with Ace wrap from just below the knee to his toes.    Pulses: 2+ radial pulses   Skin:  No rashes or lesions  Neuro/psych: A&O x3. CN II through XII are grossly intact with appropriate affect      Scheduled Meds:        aspirin 325 mg Oral Daily   atorvastatin 40 mg Oral Daily   DAPTOmycin 6 mg/kg Intravenous Q24H   gabapentin 100 mg Oral BID   hydrocortisone 1 application Topical Q12H   insulin glargine 10 Units Subcutaneous Nightly   insulin lispro 0-9 Units Subcutaneous 4x Daily With Meals & Nightly   metoprolol tartrate 50 mg Oral Q12H   micafungin (MYCAMINE)  mg Intravenous Q24H   pantoprazole 40 mg Oral QAM   sodium chloride 10 mL Intravenous Q12H   tamsulosin 0.4 mg Oral Nightly       Continuous Infusions:         PRN Meds:    dextrose  •  dextrose  •  docusate sodium  •  glucagon (human recombinant)  •  HYDROcodone-acetaminophen  •  insulin lispro **AND** insulin lispro  •  " ipratropium-albuterol  •  ketamine (KETALAR) infusion **AND** Ketamine Vital Signs & Assessment  •  nitroglycerin  •  ondansetron **OR** ondansetron  •  [COMPLETED] Insert peripheral IV **AND** sodium chloride  •  sodium chloride        Results Review:     I reviewed the patient's new clinical results.    CBC    Results from last 7 days   Lab Units 01/06/20  0606 01/05/20  1227   WBC 10*3/mm3 14.00* 21.10*   HEMOGLOBIN g/dL 9.7* 10.9*   PLATELETS 10*3/mm3 468* 487*     Cr Clearance Estimated Creatinine Clearance: 80 mL/min (A) (by C-G formula based on SCr of 0.73 mg/dL (L)).  Coag   Results from last 7 days   Lab Units 01/05/20  1227   INR  1.03   APTT seconds 20.5*     HbA1C   Lab Results   Component Value Date    HGBA1C 8.3 (H) 07/25/2019    HGBA1C 10.5 (H) 09/24/2018     Blood Glucose   Glucose   Date/Time Value Ref Range Status   01/06/2020 0750 305 (H) 70 - 105 mg/dL Final     Comment:     Serial Number: [id number]:  [id number]   01/05/2020 2147 388 (H) 70 - 105 mg/dL Final     Comment:     Serial Number: [id number]:  [id number]     Infection Results from last 7 days   Lab Units 01/05/20  1430 01/05/20  1421   BLOODCX  Abnormal Stain* Abnormal Stain*   BCIDPCR   --  Enterococcus spp. Identification by BCID PCR.*     CMP Results from last 7 days   Lab Units 01/06/20  0613 01/05/20  1227   SODIUM mmol/L 130* 134*   POTASSIUM mmol/L 5.2 4.4   CHLORIDE mmol/L 95* 99   CO2 mmol/L 21.0* 20.0*   BUN mg/dL 37* 20   CREATININE mg/dL 0.73* 0.67*   GLUCOSE mg/dL 380* 377*   ALBUMIN g/dL  --  3.30*   BILIRUBIN mg/dL  --  0.2   ALK PHOS U/L  --  190*   AST (SGOT) U/L  --  56*   ALT (SGPT) U/L  --  48*     ABG      UA  Results from last 7 days   Lab Units 01/05/20  1436   NITRITE UA  Positive*   WBC UA /HPF Too Numerous to Count*   BACTERIA UA /HPF 1+*   SQUAM EPITHEL UA /HPF 0-2     NIRMALA  No results found for: POCMETH, POCAMPHET, POCBARBITUR, POCBENZO, POCCOCAINE, POCOPIATES, POCOXYCODO, POCPHENCYC, POCPROPOXY,  POCTHC, POCTRICYC  Lysis Labs Results from last 7 days   Lab Units 01/06/20  0613 01/06/20  0606 01/05/20  1227   INR   --   --  1.03   APTT seconds  --   --  20.5*   HEMOGLOBIN g/dL  --  9.7* 10.9*   PLATELETS 10*3/mm3  --  468* 487*   CREATININE mg/dL 0.73*  --  0.67*     Radiology(recent) Xr Chest 1 View    Result Date: 1/5/2020   1. Low lung volumes. 2. No active pulmonary disease.  Electronically Signed By-John Chavira On:1/5/2020 1:10 PM This report was finalized on [id number] by  John Chavira, .        Results from last 7 days   Lab Units 01/06/20 0613   CK TOTAL U/L 94   TROPONIN T ng/mL 0.463*       Xrays, labs reviewed personally by physician.    ECG/EMG Results (most recent)     Procedure Component Value Units Date/Time    ECG 12 Lead [[id number]] Collected:  01/05/20 1150     Updated:  01/05/20 1152    Narrative:       HEART RATE= 118  bpm  RR Interval= 508  ms  UT Interval= 150  ms  P Horizontal Axis= -10  deg  P Front Axis= 64  deg  QRSD Interval= 109  ms  QT Interval= 350  ms  QRS Axis= 22  deg  T Wave Axis= -70  deg  - ABNORMAL ECG -  Sinus tachycardia  Incomplete left bundle branch block  Inferior infarct, age indeterminate  Electronically Signed By:   Date and Time of Study: 2020-01-05 11:50:25            Medication Review:   I have reviewed the patient's current medication list  Scheduled Meds:  aspirin 325 mg Oral Daily   atorvastatin 40 mg Oral Daily   DAPTOmycin 6 mg/kg Intravenous Q24H   gabapentin 100 mg Oral BID   hydrocortisone 1 application Topical Q12H   insulin glargine 10 Units Subcutaneous Nightly   insulin lispro 0-9 Units Subcutaneous 4x Daily With Meals & Nightly   metoprolol tartrate 50 mg Oral Q12H   micafungin (MYCAMINE)  mg Intravenous Q24H   pantoprazole 40 mg Oral QAM   sodium chloride 10 mL Intravenous Q12H   tamsulosin 0.4 mg Oral Nightly     Continuous Infusions:   PRN Meds:.dextrose  •  dextrose  •  docusate sodium  •  glucagon (human recombinant)  •   HYDROcodone-acetaminophen  •  insulin lispro **AND** insulin lispro  •  ipratropium-albuterol  •  ketamine (KETALAR) infusion **AND** Ketamine Vital Signs & Assessment  •  nitroglycerin  •  ondansetron **OR** ondansetron  •  [COMPLETED] Insert peripheral IV **AND** sodium chloride  •  sodium chloride    Imaging:  Imaging Results (Last 72 Hours)     Procedure Component Value Units Date/Time    XR Chest 1 View [[id number]] Collected:  01/05/20 1309     Updated:  01/05/20 1312    Narrative:       DATE OF EXAM:  1/5/2020 12:59 PM     PROCEDURE:  XR CHEST 1 VW-     INDICATIONS:  Chest pain, past tobacco abuse.      COMPARISON:  No Comparisons Available     TECHNIQUE:   Single radiographic view of the chest was obtained.     FINDINGS:  The heart size is normal. The lung volumes are diminished. The pulmonary  vascular markings are felt to be normal. The lungs and pleural spaces  are clear of active disease. There is a left upper extremity PICC line  in place with the tip terminating in superior vena cava.  There are  chronic age-related changes involving the bony thorax and thoracic  aorta. There is fusion hardware seen within the visualized lower  thoracic spine.       Impression:          1. Low lung volumes.  2. No active pulmonary disease.     Electronically Signed By-John Chavira On:1/5/2020 1:10 PM  This report was finalized on [id number] by  John Chavira, .            VASQUEZ Narayan  01/06/20  10:07 AM    Electronically signed by VASQUEZ Narayan, 01/06/20, 12:32 PM.

## 2020-01-06 NOTE — PROGRESS NOTES
[facility] Medicine Services Daily Progress Note      Hospitalist Team  LOS 0 days      Patient Care Team:  Jimy Lopez MD as PCP - General (Family Medicine)    Patient Location: 2123/1      Subjective   Subjective     Chief Complaint / Subjective  Chief Complaint   Patient presents with   • Chest Pain       Present on Admission:  • Chest pain  • Diabetes mellitus (CMS/HCC)  • Candidiasis  • Hyperlipemia  • Essential hypertension  • Peripheral vascular disease (CMS/HCC)  • GERD (gastroesophageal reflux disease)  • Coronary artery disease  • Benign prostatic disease    Patient continuing to complain of chest pain, states worse with deep breath or movement. Patient's wife at bedside concerned about patient having to need surgeries in the future, states he reacts very poorly to anesthesia.     Brief Synopsis of Hospital Course/HPI  Mr. Solano is a 74 y.o. white male, nursing home resident, with multiple medical problems, a history of coronary artery disease, PVD S/P R AKA,  Presented with chest pain .  Patient states the pain started this morning and has been constant, moderate.  The pain is in the center of his chest and radiates to his neck. Worse with deep inspiration, He has had associated shortness of breath and diaphoresis.  He denies any alleviating or exacerbating factors.  Pain is described as moderate in intensity. Found to have a temp 99.3.       Review of Systems   Constitution: Positive for malaise/fatigue. Negative for chills, decreased appetite, diaphoresis, fever, night sweats, weight gain and weight loss.   Cardiovascular: Positive for chest pain. Negative for claudication, cyanosis, dyspnea on exertion, irregular heartbeat, leg swelling, near-syncope, orthopnea, palpitations, paroxysmal nocturnal dyspnea and syncope.   Respiratory: Negative for cough, hemoptysis, shortness of breath, sleep disturbances due to breathing, snoring, sputum production and wheezing.           "        Objective   Objective      Vital Signs  Temp:  [97.5 °F (36.4 °C)-99 °F (37.2 °C)] 97.5 °F (36.4 °C)  Heart Rate:  [105-120] 105  Resp:  [16-36] 18  BP: ()/(50-87) 117/69  Oxygen Therapy  SpO2: 94 %  Pulse Oximetry Type: Intermittent  Device (Oxygen Therapy): room air  Flowsheet Rows      First Filed Value   Admission Height  180.3 cm (71\") Documented at 01/05/2020 1145   Admission Weight  70.3 kg (155 lb) Documented at 01/05/2020 1145        Intake & Output (last 3 days)       01/03 0701 - 01/04 0700 01/04 0701 - 01/05 0700 01/05 0701 - 01/06 0700 01/06 0701 - 01/07 0700    I.V. (mL/kg)   250 (3.5)     IV Piggyback   1000     Total Intake(mL/kg)   1250 (17.6)     Urine (mL/kg/hr)   400     Stool   0     Total Output   400     Net   +850             Stool Unmeasured Occurrence   1 x         Lines, Drains & Airways    Active LDAs     Name:   Placement date:   Placement time:   Site:   Days:    PICC Single Lumen 01/05/20 Left   01/05/20    1155    --   1    Urethral Catheter   01/05/20 PRESENT UPON ARRIVAL     1157     1                  Physical Exam:    Physical Exam   Constitutional: He is oriented to person, place, and time. He appears well-developed and well-nourished. No distress.   HENT:   Head: Normocephalic and atraumatic.   Nose: Nose normal.   Mouth/Throat: No oropharyngeal exudate.   Eyes: Pupils are equal, round, and reactive to light. Conjunctivae are normal. Right eye exhibits no discharge. Left eye exhibits no discharge.   Neck: Normal range of motion. Neck supple. No tracheal deviation present.   Cardiovascular: Normal rate, regular rhythm and normal heart sounds. Exam reveals no gallop and no friction rub.   No murmur heard.  Pulmonary/Chest: Effort normal and breath sounds normal. No stridor. No respiratory distress. He has no wheezes. He has no rales. He exhibits no tenderness.   Abdominal: Soft. Bowel sounds are normal. He exhibits no distension. There is no tenderness. There is no " guarding.   Musculoskeletal: Normal range of motion. He exhibits no edema, tenderness or deformity.   Neurological: He is alert and oriented to person, place, and time. No cranial nerve deficit.   Skin: Skin is warm and dry. No rash noted. He is not diaphoretic. No erythema. No pallor.         Procedures:              Results Review:     I reviewed the patient's new clinical results.      Lab Results (last 24 hours)     Procedure Component Value Units Date/Time    POC Glucose Once [[id number]]  (Abnormal) Collected:  01/06/20 1133    Specimen:  Blood Updated:  01/06/20 1213     Glucose 263 mg/dL      Comment: Serial Number: [id number]:  [id number]       CK [[id number]]  (Normal) Collected:  01/06/20 1011    Specimen:  Blood Updated:  01/06/20 1116     Creatine Kinase 102 U/L     Urine Culture - Urine, Urine, Catheter [[id number]]  (Abnormal) Collected:  01/05/20 1436    Specimen:  Urine, Catheter Updated:  01/06/20 1048     Urine Culture >100,000 CFU/mL Escherichia coli    Blood Culture - Blood, Blood, Venous Line [[id number]] Collected:  01/06/20 1011    Specimen:  Blood, Venous Line Updated:  01/06/20 1022    Lipid Panel [[id number]]  (Abnormal) Collected:  01/06/20 0613    Specimen:  Blood Updated:  01/06/20 0954     Total Cholesterol 121 mg/dL      Triglycerides 78 mg/dL      HDL Cholesterol 34 mg/dL      LDL Cholesterol  71 mg/dL      VLDL Cholesterol 15.6 mg/dL      LDL/HDL Ratio 2.10    Narrative:       Cholesterol Reference Ranges  (U.S. Department of Health and Human Services ATP III Classifications)    Desirable          <200 mg/dL  Borderline High    200-239 mg/dL  High Risk          >240 mg/dL      Triglyceride Reference Ranges  (U.S. Department of Health and Human Services ATP III Classifications)    Normal           <150 mg/dL  Borderline High  150-199 mg/dL  High             200-499 mg/dL  Very High        >500 mg/dL    HDL Reference Ranges  (U.S. Department of Health and Human Services ATP III  Classifcations)    Low     <40 mg/dl (major risk factor for CHD)  High    >60 mg/dl ('negative' risk factor for CHD)        LDL Reference Ranges  (U.S. Department of Health and Human Services ATP III Classifcations)    Optimal          <100 mg/dL  Near Optimal     100-129 mg/dL  Borderline High  130-159 mg/dL  High             160-189 mg/dL  Very High        >189 mg/dL    Troponin [[id number]]  (Abnormal) Collected:  01/06/20 0613    Specimen:  Blood Updated:  01/06/20 0905     Troponin T 0.463 ng/mL     Narrative:       Troponin T Reference Range:  <= 0.03 ng/mL-   Negative for AMI  >0.03 ng/mL-     Abnormal for myocardial necrosis.  Clinicians would have to utilize clinical acumen, EKG, Troponin and serial changes to determine if it is an Acute Myocardial Infarction or myocardial injury due to an underlying chronic condition.     Basic Metabolic Panel [[id number]]  (Abnormal) Collected:  01/06/20 0613    Specimen:  Blood Updated:  01/06/20 0843     Glucose 380 mg/dL      BUN 37 mg/dL      Creatinine 0.73 mg/dL      Sodium 130 mmol/L      Potassium 5.2 mmol/L      Chloride 95 mmol/L      CO2 21.0 mmol/L      Calcium 9.2 mg/dL      eGFR Non African Amer 105 mL/min/1.73      BUN/Creatinine Ratio 50.7     Anion Gap 14.0 mmol/L     Narrative:       GFR Normal >60  Chronic Kidney Disease <60  Kidney Failure <15      CK [[id number]]  (Normal) Collected:  01/06/20 0613    Specimen:  Blood Updated:  01/06/20 0842     Creatine Kinase 94 U/L     Magnesium [[id number]]  (Abnormal) Collected:  01/06/20 0613    Specimen:  Blood Updated:  01/06/20 0842     Magnesium 2.5 mg/dL     Phosphorus [[id number]]  (Normal) Collected:  01/06/20 0613    Specimen:  Blood Updated:  01/06/20 0842     Phosphorus 4.3 mg/dL     TSH [[id number]]  (Normal) Collected:  01/06/20 0613    Specimen:  Blood Updated:  01/06/20 0838     TSH 3.990 uIU/mL     Calcium, Ionized [[id number]]  (Normal) Collected:  01/06/20 0613    Specimen:  Blood Updated:  01/06/20  0816     Ionized Calcium 1.27 mmol/L     POC Glucose Once [[id number]]  (Abnormal) Collected:  01/06/20 0750    Specimen:  Blood Updated:  01/06/20 0752     Glucose 305 mg/dL      Comment: Serial Number: [id number]:  [id number]       Blood Culture - Blood, Hand, Left [[id number]]  (Abnormal) Collected:  01/05/20 1430    Specimen:  Blood from Hand, Left Updated:  01/06/20 0734     Blood Culture Abnormal Stain     Gram Stain Anaerobic Bottle Gram positive cocci in chains      Aerobic Bottle Gram positive cocci in chains    Blood Culture ID, PCR - Blood, Arm, Left [[id number]]  (Abnormal) Collected:  01/05/20 1421    Specimen:  Blood from Arm, Left Updated:  01/06/20 0618     BCID, PCR Enterococcus spp. Identification by BCID PCR.     BOTTLE TYPE Aerobic Bottle    CBC & Differential [[id number]] Collected:  01/06/20 0606    Specimen:  Blood Updated:  01/06/20 0617    Narrative:       The following orders were created for panel order CBC & Differential.  Procedure                               Abnormality         Status                     ---------                               -----------         ------                     Manual Differential[[id number]]                                                         CBC Auto Differential[[id number]]        Abnormal            Final result                 Please view results for these tests on the individual orders.    CBC Auto Differential [[id number]]  (Abnormal) Collected:  01/06/20 0606    Specimen:  Blood Updated:  01/06/20 0617     WBC 14.00 10*3/mm3      RBC 3.71 10*6/mm3      Hemoglobin 9.7 g/dL      Hematocrit 29.7 %      MCV 79.8 fL      MCH 26.2 pg      MCHC 32.9 g/dL      RDW 20.7 %      RDW-SD 58.2 fl      MPV 6.4 fL      Platelets 468 10*3/mm3      Neutrophil % 80.9 %      Lymphocyte % 7.8 %      Monocyte % 10.7 %      Eosinophil % 0.1 %      Basophil % 0.5 %      Neutrophils, Absolute 11.30 10*3/mm3      Lymphocytes, Absolute 1.10 10*3/mm3      Monocytes,  Absolute 1.50 10*3/mm3      Eosinophils, Absolute 0.00 10*3/mm3      Basophils, Absolute 0.10 10*3/mm3      nRBC 0.0 /100 WBC     Blood Culture - Blood, Arm, Left [[id number]]  (Abnormal) Collected:  01/05/20 1421    Specimen:  Blood from Arm, Left Updated:  01/06/20 0359     Blood Culture Abnormal Stain     Gram Stain Aerobic Bottle Gram positive cocci in chains      Anaerobic Bottle Gram positive cocci in chains    POC Glucose Once [[id number]]  (Abnormal) Collected:  01/05/20 2147    Specimen:  Blood Updated:  01/05/20 2148     Glucose 388 mg/dL      Comment: Serial Number: [id number]:  [id number]       Troponin [[id number]]  (Abnormal) Collected:  01/05/20 2051    Specimen:  Blood Updated:  01/05/20 2117     Troponin T 0.296 ng/mL     Narrative:       Troponin T Reference Range:  <= 0.03 ng/mL-   Negative for AMI  >0.03 ng/mL-     Abnormal for myocardial necrosis.  Clinicians would have to utilize clinical acumen, EKG, Troponin and serial changes to determine if it is an Acute Myocardial Infarction or myocardial injury due to an underlying chronic condition.     BNP [[id number]]  (Normal) Collected:  01/05/20 2051    Specimen:  Blood Updated:  01/05/20 2114     proBNP 810.6 pg/mL     Narrative:       Among patients with dyspnea, NT-proBNP is highly sensitive for the detection of acute congestive heart failure. In addition NT-proBNP of <300 pg/ml effectively rules out acute congestive heart failure with 99% negative predictive value.      Lactic Acid, Reflex [[id number]]  (Abnormal) Collected:  01/05/20 1742    Specimen:  Blood Updated:  01/05/20 1816     Lactate 3.3 mmol/L     Lactic Acid, Reflex Timer (This will reflex a repeat order 3-3:15 hours after ordered.) [[id number]] Collected:  01/05/20 1421    Specimen:  Blood Updated:  01/05/20 1730     Extra Tube Hold for add-ons.     Comment: Auto resulted.       Urinalysis, Microscopic Only - Urine, Catheter [[id number]]  (Abnormal) Collected:  01/05/20 1436     Specimen:  Urine, Catheter Updated:  01/05/20 1502     RBC, UA Too Numerous to Count /HPF      WBC, UA Too Numerous to Count /HPF      Bacteria, UA 1+ /HPF      Squamous Epithelial Cells, UA 0-2 /HPF      Hyaline Casts, UA None Seen /LPF      Methodology Manual Light Microscopy    Urinalysis With Culture If Indicated - Urine, Catheter [[id number]]  (Abnormal) Collected:  01/05/20 1436    Specimen:  Urine, Catheter Updated:  01/05/20 1458     Color, UA Dark Yellow     Comment: Result checked         Appearance, UA Turbid     Comment: Result checked         pH, UA 5.5     Specific Gravity, UA 1.024     Glucose, UA Negative     Ketones, UA Trace     Bilirubin, UA Moderate (2+)     Comment: Confirmation testing is unavailable.  A serum bilirubin is recommended for further assessment.        Blood, UA Large (3+)     Protein, UA >=300 mg/dL (3+)     Leuk Esterase, UA Large (3+)     Nitrite, UA Positive     Urobilinogen, UA 1.0 E.U./dL    Influenza Antigen, Rapid - Swab, Nasopharynx [[id number]]  (Normal) Collected:  01/05/20 1422    Specimen:  Swab from Nasopharynx Updated:  01/05/20 1445     Influenza A PCR Not Detected     Influenza B PCR Not Detected    POC Lactate [[id number]]  (Abnormal) Collected:  01/05/20 1421    Specimen:  Blood Updated:  01/05/20 1422     Lactate 3.1 mmol/L      Comment: Serial Number: [id number]:  [id number]       Troponin [[id number]]  (Abnormal) Collected:  01/05/20 1227    Specimen:  Blood Updated:  01/05/20 1255     Troponin T 0.039 ng/mL     Narrative:       Troponin T Reference Range:  <= 0.03 ng/mL-   Negative for AMI  >0.03 ng/mL-     Abnormal for myocardial necrosis.  Clinicians would have to utilize clinical acumen, EKG, Troponin and serial changes to determine if it is an Acute Myocardial Infarction or myocardial injury due to an underlying chronic condition.     Comprehensive Metabolic Panel [[id number]]  (Abnormal) Collected:  01/05/20 1227    Specimen:  Blood Updated:  01/05/20  1253     Glucose 377 mg/dL      BUN 20 mg/dL      Creatinine 0.67 mg/dL      Sodium 134 mmol/L      Potassium 4.4 mmol/L      Chloride 99 mmol/L      CO2 20.0 mmol/L      Calcium 9.7 mg/dL      Total Protein 6.9 g/dL      Albumin 3.30 g/dL      ALT (SGPT) 48 U/L      AST (SGOT) 56 U/L      Alkaline Phosphatase 190 U/L      Total Bilirubin 0.2 mg/dL      eGFR Non African Amer 116 mL/min/1.73      eGFR  African Amer 140 mL/min/1.73      Globulin 3.6 gm/dL      A/G Ratio 0.9 g/dL      BUN/Creatinine Ratio 29.9     Anion Gap 15.0 mmol/L     Narrative:       GFR Normal >60  Chronic Kidney Disease <60  Kidney Failure <15      BNP [[id number]]  (Normal) Collected:  01/05/20 1227    Specimen:  Blood Updated:  01/05/20 1251     proBNP 1,272.0 pg/mL     Narrative:       Among patients with dyspnea, NT-proBNP is highly sensitive for the detection of acute congestive heart failure. In addition NT-proBNP of <300 pg/ml effectively rules out acute congestive heart failure with 99% negative predictive value.      Protime-INR [[id number]]  (Normal) Collected:  01/05/20 1227    Specimen:  Blood Updated:  01/05/20 1240     Protime 10.8 Seconds      INR 1.03    aPTT [[id number]]  (Abnormal) Collected:  01/05/20 1227    Specimen:  Blood Updated:  01/05/20 1240     PTT 20.5 seconds     CBC & Differential [[id number]] Collected:  01/05/20 1227    Specimen:  Blood Updated:  01/05/20 1232    Narrative:       The following orders were created for panel order CBC & Differential.  Procedure                               Abnormality         Status                     ---------                               -----------         ------                     CBC Auto Differential[[id number]]        Abnormal            Final result                 Please view results for these tests on the individual orders.    CBC Auto Differential [[id number]]  (Abnormal) Collected:  01/05/20 1227    Specimen:  Blood Updated:  01/05/20 1232     WBC 21.10 10*3/mm3      RBC  4.28 10*6/mm3      Hemoglobin 10.9 g/dL      Hematocrit 34.8 %      MCV 81.3 fL      MCH 25.6 pg      MCHC 31.4 g/dL      RDW 21.1 %      RDW-SD 61.3 fl      MPV 6.4 fL      Platelets 487 10*3/mm3      Neutrophil % 87.3 %      Lymphocyte % 5.5 %      Monocyte % 5.9 %      Eosinophil % 1.0 %      Basophil % 0.3 %      Neutrophils, Absolute 18.40 10*3/mm3      Lymphocytes, Absolute 1.20 10*3/mm3      Monocytes, Absolute 1.20 10*3/mm3      Eosinophils, Absolute 0.20 10*3/mm3      Basophils, Absolute 0.10 10*3/mm3      nRBC 0.0 /100 WBC         No results found for: HGBA1C  Results from last 7 days   Lab Units 01/05/20  1227   INR  1.03               Microbiology Results (last 10 days)     Procedure Component Value - Date/Time    Urine Culture - Urine, Urine, Catheter [[id number]]  (Abnormal) Collected:  01/05/20 1436    Lab Status:  Preliminary result Specimen:  Urine, Catheter Updated:  01/06/20 1048     Urine Culture >100,000 CFU/mL Escherichia coli    Blood Culture - Blood, Hand, Left [[id number]]  (Abnormal) Collected:  01/05/20 1430    Lab Status:  Preliminary result Specimen:  Blood from Hand, Left Updated:  01/06/20 0734     Blood Culture Abnormal Stain     Gram Stain Anaerobic Bottle Gram positive cocci in chains      Aerobic Bottle Gram positive cocci in chains    Influenza Antigen, Rapid - Swab, Nasopharynx [[id number]]  (Normal) Collected:  01/05/20 1422    Lab Status:  Final result Specimen:  Swab from Nasopharynx Updated:  01/05/20 1445     Influenza A PCR Not Detected     Influenza B PCR Not Detected    Blood Culture - Blood, Arm, Left [[id number]]  (Abnormal) Collected:  01/05/20 1421    Lab Status:  Preliminary result Specimen:  Blood from Arm, Left Updated:  01/06/20 0359     Blood Culture Abnormal Stain     Gram Stain Aerobic Bottle Gram positive cocci in chains      Anaerobic Bottle Gram positive cocci in chains    Blood Culture ID, PCR - Blood, Arm, Left [[id number]]  (Abnormal) Collected:  01/05/20 1421     Lab Status:  Final result Specimen:  Blood from Arm, Left Updated:  01/06/20 0618     BCID, PCR Enterococcus spp. Identification by BCID PCR.     BOTTLE TYPE Aerobic Bottle          ECG/EMG Results (most recent)     Procedure Component Value Units Date/Time    ECG 12 Lead [[id number]] Collected:  01/05/20 1150     Updated:  01/05/20 1152    Narrative:       HEART RATE= 118  bpm  RR Interval= 508  ms  OK Interval= 150  ms  P Horizontal Axis= -10  deg  P Front Axis= 64  deg  QRSD Interval= 109  ms  QT Interval= 350  ms  QRS Axis= 22  deg  T Wave Axis= -70  deg  - ABNORMAL ECG -  Sinus tachycardia  Incomplete left bundle branch block  Inferior infarct, age indeterminate  Electronically Signed By:   Date and Time of Study: 2020-01-05 11:50:25                    Xr Chest 1 View    Result Date: 1/5/2020   1. Low lung volumes. 2. No active pulmonary disease.  Electronically Signed By-John Chavira On:1/5/2020 1:10 PM This report was finalized on [id number] by  John Chavira, .      Xrays, labs reviewed personally by physician.    Medication Review:   I have reviewed the patient's current medication list      Scheduled Meds    aspirin 325 mg Oral Daily   atorvastatin 40 mg Oral Daily   DAPTOmycin 6 mg/kg Intravenous Q24H   gabapentin 100 mg Oral BID   hydrocortisone 1 application Topical Q12H   insulin glargine 10 Units Subcutaneous Nightly   insulin lispro 0-9 Units Subcutaneous 4x Daily With Meals & Nightly   metoprolol tartrate 50 mg Oral Q12H   micafungin (MYCAMINE)  mg Intravenous Q24H   pantoprazole 40 mg Oral QAM   sodium chloride 10 mL Intravenous Q12H   tamsulosin 0.4 mg Oral Nightly       Meds Infusions       Meds PRN  dextrose  •  dextrose  •  docusate sodium  •  glucagon (human recombinant)  •  HYDROcodone-acetaminophen  •  insulin lispro **AND** insulin lispro  •  ipratropium-albuterol  •  ketamine (KETALAR) infusion **AND** Ketamine Vital Signs & Assessment  •  nitroglycerin  •  ondansetron **OR**  ondansetron  •  [COMPLETED] Insert peripheral IV **AND** sodium chloride  •  sodium chloride      Assessment/Plan   Assessment/Plan     Active Hospital Problems    Diagnosis  POA   • Chest pain [R07.9]  Yes   • Diabetes mellitus (CMS/HCC) [E11.9]  Yes   • Candidiasis [B37.9]  Yes   • Hyperlipemia [E78.5]  Yes   • Essential hypertension [I10]  Yes   • Peripheral vascular disease (CMS/HCC) [I73.9]  Yes   • GERD (gastroesophageal reflux disease) [K21.9]  Yes   • Coronary artery disease [I25.10]  Yes   • Benign prostatic disease [N42.9]  Yes      Resolved Hospital Problems   No resolved problems to display.     Sepsis present on admission-lilkely secondary to UTI, indueling martinez cath  WBC trending down. Resolving.  -Urology c/s for UTI 2/2 to indwelling martinez cath   -ID c/s, appreciate recs   -vancomycin/cefepime  -Blood cultures pending   -has a picc dec/6/2019>on eraxis     Chest pain/Hx of CAD  -Troponin uptrending w/ significant cardiac hx (recent cath 11/2019 in [place])  -Cardiology c/s, appreciate recs   -Myoview  -serial enzymes  -echo     DM2  -continue long actin insulin  -RISS  -hold po meds     HTN-bp 103/67  -hold metoprolol, losartan     HLD  -continue lipitor  -check lipid p     Candidemia  -continue eraxis        VTE Prophylaxis - SCDs.      Code Status -   Code Status and Medical Interventions:   Ordered at: 01/1944     Level Of Support Discussed With:    Patient     Code Status:    CPR     Medical Interventions (Level of Support Prior to Arrest):    Full       Discharge Planning    Destination      Coordination has not been started for this encounter.      Durable Medical Equipment      Coordination has not been started for this encounter.      Dialysis/Infusion      Coordination has not been started for this encounter.      Home Medical Care      Coordination has not been started for this encounter.      Therapy      Coordination has not been started for this encounter.      [facility] Resources       Coordination has not been started for this encounter.            Electronically signed by Radha Swanson MD, 01/06/20, 12:26 PM.  [facility] Hospitalist Team

## 2020-01-06 NOTE — H&P
[facility] Medicine Services      Patient Name: Artur Solano  : 1945  MRN: [id number]  Primary Care Physician: Jimy Lopez MD  Date of admission: 2020    Patient Care Team:  Jimy Lopez MD as PCP - General (Family Medicine)    Subjective   History Present Illness     Chief Complaint:   Chief Complaint   Patient presents with   • Chest Pain     Mr. Solano is a 74 y.o. white male, nursing home resident, with multiple medical problems, a history of coronary artery disease, PVD S/P R AKA,  Presented with chest pain .  Patient states the pain started this morning and has been constant, moderate.  The pain is in the center of his chest and radiates to his neck. Worse with deep inspiration, He has had associated shortness of breath and diaphoresis.  He denies any alleviating or exacerbating factors.  Pain is described as moderate in intensity. Found to have a temp 99.3,     Social   etoh quit 30 yrs ago, quit smoking     Family hx non contributory    Review of Systems   Constitution: Positive for fever.   HENT: Negative.    Eyes: Negative.    Cardiovascular: Positive for chest pain.   Respiratory: Positive for shortness of breath.    Endocrine: Negative.    Hematologic/Lymphatic: Negative.    Skin: Negative.    Musculoskeletal: Positive for joint pain.   Gastrointestinal: Positive for constipation.   Genitourinary: Positive for dysuria.   Neurological: Negative.    Psychiatric/Behavioral: Negative.    Allergic/Immunologic: Negative.    All other systems reviewed and are negative.      Personal History     Past Medical History:   Past Medical History:   Diagnosis Date   • Angina at rest (CMS/[facility])    • Benign prostatic disease    • Candidiasis    • Chest pain 2020   • Constipation    • Coronary artery disease    • Cutaneous abscess     right lower limb   • CVA (cerebral vascular accident) (CMS/HCC)     years ago TIA per wife   • Diabetes mellitus  (CMS/HCC)     Type 2   • Essential hypertension    • GERD (gastroesophageal reflux disease)    • Hyperlipemia    • Hypoglycemia    • Iron deficiency anemia    • Peripheral vascular disease (CMS/HCC)    • Urethral stricture    • UTI (urinary tract infection)      Surgical History:      Past Surgical History:   Procedure Laterality Date   • ABDOMINAL AORTIC ANEURYSM REPAIR     • ABOVE KNEE LEG AMPUTATION Right    • BACK SURGERY  1975, 2008   • CARDIAC CATHETERIZATION  2005    with stent placement   • CAROTID ENDARTERECTOMY Left    • FEMORAL ENDARTERECTOMY Bilateral    • HIP PINNING Left      Family History: family history is not on file. Otherwise pertinent FHx was reviewed and unremarkable.     Social History:  reports that he has quit smoking. He does not have any smokeless tobacco history on file. He reports that he does not use drugs.    Medications:  Prior to Admission medications    Medication Sig Start Date End Date Taking? Authorizing Provider   anidulafungin (ERAXIS) 100 MG injection Infuse 100 mg into a venous catheter Every Night. 34 total days; last day Wednesday, 1/8/20 1/8/20 Yes Terri Machado MD   aspirin 325 MG tablet Take 325 mg by mouth Daily.   Yes Terri Machado MD   atorvastatin (LIPITOR) 40 MG tablet Take 40 mg by mouth Daily.   Yes Terri Machado MD   Cranberry-Vitamin C-Probiotic (AZO CRANBERRY) 250-30 MG tablet Take 1 tablet by mouth Daily As Needed (bladder spasms).   Yes Terri Machado MD   famotidine (PEPCID) 20 MG tablet Take 20 mg by mouth 2 (Two) Times a Day As Needed.   Yes Terri Machado MD   gabapentin (NEURONTIN) 100 MG capsule Take 100 mg by mouth 2 (Two) Times a Day. 12/4/19  Yes Terri Machado MD   glipizide (GLUCOTROL) 10 MG tablet Take 10 mg by mouth 2 (Two) Times a Day Before Meals.   Yes Terri Machado MD   hydrocortisone 1 % ointment Apply 1 application topically to the appropriate area as directed 2 (Two) Times a Day As  Needed.   Yes Terri Machado MD   insulin detemir (LEVEMIR) 100 UNIT/ML injection Inject 20 Units under the skin into the appropriate area as directed Every Morning.   Yes Terri Machado MD   ipratropium-albuterol (DUO-NEB) 0.5-2.5 mg/3 ml nebulizer Take 3 mL by nebulization 3 (Three) Times a Day As Needed for Wheezing.   Yes Terri Machado MD   metFORMIN (GLUCOPHAGE) 1000 MG tablet Take 1,000 mg by mouth 2 (Two) Times a Day With Meals.   Yes Terri Machado MD   metoprolol tartrate (LOPRESSOR) 50 MG tablet Take 50 mg by mouth 2 (Two) Times a Day.   Yes Terri Machado MD   nitroglycerin (NITROSTAT) 0.4 MG SL tablet Place 0.4 mg under the tongue Every 5 (Five) Minutes As Needed for Chest Pain. Take no more than 3 doses in 15 minutes.   Yes Terri Machado MD   SITagliptin (JANUVIA) 100 MG tablet Take 100 mg by mouth Every Night.   Yes Terri Machado MD   tamsulosin (FLOMAX) 0.4 MG capsule 24 hr capsule Take 0.4 mg by mouth Daily.   Yes Terri Machado MD   zinc oxide 20 % ointment Apply 1 application topically to the appropriate area as directed 2 (Two) Times a Day As Needed.  1/5/20  Terri Machado MD       Allergies:    Allergies   Allergen Reactions   • Keflex [Cephalexin] Unknown - High Severity     Unknown     • Lisinopril Unknown - High Severity     unknown       Objective   Objective     Vital Signs  Temp:  [97.8 °F (36.6 °C)-99.3 °F (37.4 °C)] 97.8 °F (36.6 °C)  Heart Rate:  [110-120] 110  Resp:  [16-36] 16  BP: ()/(58-87) 103/67  SpO2:  [97 %-100 %] 100 %  on   ;   Device (Oxygen Therapy): room air  Body mass index is 21.62 kg/m².    Physical Exam   Constitutional: He is oriented to person, place, and time. He appears well-developed and well-nourished. No distress.   HENT:   Head: Normocephalic and atraumatic.   Right Ear: External ear normal.   Left Ear: External ear normal.   Nose: Nose normal.   Mouth/Throat: Oropharynx is clear and moist.  No oropharyngeal exudate.   Eyes: Pupils are equal, round, and reactive to light. Conjunctivae and EOM are normal. Right eye exhibits no discharge. Left eye exhibits no discharge. No scleral icterus.   Neck: Normal range of motion. No JVD present. No tracheal deviation present. No thyromegaly present.   Cardiovascular: Normal rate, regular rhythm, normal heart sounds and intact distal pulses. Exam reveals no gallop and no friction rub.   No murmur heard.  Pulmonary/Chest: Effort normal and breath sounds normal. No stridor. No respiratory distress. He has no wheezes. He has no rales. He exhibits no tenderness.   Abdominal: Soft. Bowel sounds are normal. He exhibits no distension and no mass. There is no tenderness. There is no rebound and no guarding. No hernia.   Genitourinary:   Genitourinary Comments: Cedeno cath   Musculoskeletal: Normal range of motion. He exhibits no edema, tenderness or deformity.   R-AKA  degenerative cahnges   Lymphadenopathy:     He has no cervical adenopathy.   Neurological: He is alert and oriented to person, place, and time. No cranial nerve deficit or sensory deficit. He exhibits normal muscle tone. Coordination normal.   Skin: Skin is warm and dry. No rash noted. He is not diaphoretic. No erythema.   Psychiatric: He has a normal mood and affect. His behavior is normal.   Nursing note and vitals reviewed.      Results Review:  I have personally reviewed most recent lab results and agree with findings, most notably.    Results from last 7 days   Lab Units 01/05/20  1227   WBC 10*3/mm3 21.10*   HEMOGLOBIN g/dL 10.9*   HEMATOCRIT % 34.8*   PLATELETS 10*3/mm3 487*   INR  1.03     Results from last 7 days   Lab Units 01/05/20  1742  01/05/20  1227   SODIUM mmol/L  --   --  134*   POTASSIUM mmol/L  --   --  4.4   CHLORIDE mmol/L  --   --  99   CO2 mmol/L  --   --  20.0*   BUN mg/dL  --   --  20   CREATININE mg/dL  --   --  0.67*   GLUCOSE mg/dL  --   --  377*   CALCIUM mg/dL  --   --  9.7      ALT (SGPT) U/L  --   --  48*   AST (SGOT) U/L  --   --  56*   TROPONIN T ng/mL  --   --  0.039*   PROBNP pg/mL  --   --  1,272.0   LACTATE mmol/L 3.3*   < >  --     < > = values in this interval not displayed.     Estimated Creatinine Clearance: 79.3 mL/min (A) (by C-G formula based on SCr of 0.67 mg/dL (L)).  Brief Urine Lab Results  (Last result in the past 365 days)      Color   Clarity   Blood   Leuk Est   Nitrite   Protein   CREAT   Urine HCG        01/05/20 1436 Dark Yellow  Comment:  Result checked  Turbid  Comment:  Result checked  Large (3+) Large (3+) Positive >=300 mg/dL (3+)               Microbiology Results (last 10 days)     Procedure Component Value - Date/Time    Influenza Antigen, Rapid - Swab, Nasopharynx [[id number]]  (Normal) Collected:  01/05/20 1422    Lab Status:  Final result Specimen:  Swab from Nasopharynx Updated:  01/05/20 1445     Influenza A PCR Not Detected     Influenza B PCR Not Detected          ECG/EMG Results (most recent)     Procedure Component Value Units Date/Time    ECG 12 Lead [[id number]] Collected:  01/05/20 1150     Updated:  01/05/20 1152    Narrative:       HEART RATE= 118  bpm  RR Interval= 508  ms  MA Interval= 150  ms  P Horizontal Axis= -10  deg  P Front Axis= 64  deg  QRSD Interval= 109  ms  QT Interval= 350  ms  QRS Axis= 22  deg  T Wave Axis= -70  deg  - ABNORMAL ECG -  Sinus tachycardia  Incomplete left bundle branch block  Inferior infarct, age indeterminate  Electronically Signed By:   Date and Time of Study: 2020-01-05 11:50:25                    Xr Chest 1 View    Result Date: 1/5/2020   1. Low lung volumes. 2. No active pulmonary disease.  Electronically Signed By-John Chavira On:1/5/2020 1:10 PM This report was finalized on [id number] by  John Chavira, .        Estimated Creatinine Clearance: 79.3 mL/min (A) (by C-G formula based on SCr of 0.67 mg/dL (L)).    Assessment/Plan   Assessment/Plan       Active Hospital Problems    Diagnosis  POA   • Chest pain  [R07.9]  Yes   • Diabetes mellitus (CMS/HCC) [E11.9]  Yes   • Candidiasis [B37.9]  Yes   • Hyperlipemia [E78.5]  Yes   • Essential hypertension [I10]  Yes   • Peripheral vascular disease (CMS/HCC) [I73.9]  Yes   • GERD (gastroesophageal reflux disease) [K21.9]  Yes   • Coronary artery disease [I25.10]  Yes   • Benign prostatic disease [N42.9]  Yes      Resolved Hospital Problems   No resolved problems to display.       Active Hospital Problems:    Sepsis present on admission-lilkely secondary to UTI, indueling martinez cath  WBC 21.   -CXR:no active disease   -IV fluid bolus given in ER  -lactic 3.1-3.3  -continue iv abx,  -vancomycin/cefepime  -Blood cultures.  -has a picc dec/6/2019>on eraxis  -monitor CBC    Chest pain/Hx of CAD  -Myoview  -serial enzymes  -echo    DM2  -continue long actin insulin  -RISS  -hold po meds    HTN-bp 103/67  -hold metoprolol, losartan    HLD  -continue lipitor  -check lipid p    Candidemia  -continue eraxis      VTE Prophylaxis - SCDs.    CODE STATUS:    Code Status and Medical Interventions:   Ordered at: 01/1945     Level Of Support Discussed With:    Patient     Code Status:    CPR     Medical Interventions (Level of Support Prior to Arrest):    Full       Admission Status:  I believe this patient meets obs  criteria.      I discussed the patients findings and my recommendations with patient.        Electronically signed by Jarocho Pickard MD, 01/05/20, 7:37 PM.  [facility] Hospitalist Team

## 2020-01-06 NOTE — PLAN OF CARE
Problem: Fall Risk (Adult)  Goal: Identify Related Risk Factors and Signs and Symptoms  Outcome: Ongoing (interventions implemented as appropriate)  Flowsheets (Taken 1/6/2020 1736)  Related Risk Factors (Fall Risk): bladder function altered; confusion/agitation; fatigue/slow reaction; gait/mobility problems; neuro disease/injury; sensory deficits  Signs and Symptoms (Fall Risk): presence of risk factors  Goal: Absence of Fall  Outcome: Ongoing (interventions implemented as appropriate)  Flowsheets (Taken 1/6/2020 1736)  Absence of Fall: making progress toward outcome     Problem: Patient Care Overview  Goal: Plan of Care Review  Outcome: Ongoing (interventions implemented as appropriate)  Flowsheets (Taken 1/6/2020 1736)  Progress: no change  Plan of Care Reviewed With: patient  Outcome Summary: cardiology, urology, ID consulted. pending pallative consult. echo done, myoview cancelled  Goal: Individualization and Mutuality  Outcome: Ongoing (interventions implemented as appropriate)  Goal: Discharge Needs Assessment  Outcome: Ongoing (interventions implemented as appropriate)  Flowsheets  Taken 1/5/2020 1841 by Miri Juarez RN  Equipment Currently Used at Home: wheelchair;wound care supplies  Taken 1/5/2020 1847 by Miri Juarez, RN  Transportation Anticipated: health plan transportation  Patient/Family Anticipated Services at Transition:   Patient/Family Anticipates Transition to: inpatient rehabilitation facility  Goal: Interprofessional Rounds/Family Conf  Outcome: Ongoing (interventions implemented as appropriate)  Flowsheets (Taken 1/6/2020 1736)  Summary: cardiology, ID, Urology consulted today. echo done. unable to do myoveiw related to increase in troponin  Participants: ; nursing; patient; pharmacy; social work/services     Problem: Skin Injury Risk (Adult)  Goal: Identify Related Risk Factors and Signs and Symptoms  Outcome: Ongoing (interventions implemented as  appropriate)  Flowsheets (Taken 1/6/2020 1731)  Related Risk Factors (Skin Injury Risk): advanced age; edema; infection; mobility impaired; moisture; tissue perfusion altered  Goal: Skin Health and Integrity  Outcome: Ongoing (interventions implemented as appropriate)  Flowsheets (Taken 1/6/2020 1730)  Skin Health and Integrity: making progress toward outcome

## 2020-01-06 NOTE — PROGRESS NOTES
Continued Stay Note   [facility]     Patient Name: Artur Solano  MRN: [id number]  Today's Date: 1/6/2020    Admit Date: 1/5/2020    Discharge Plan     Row Name 01/06/20 1649       Plan    Plan  DC Plan: PT eval pending. From [facility] [place] (was expected to finish abx course on 1/8 per facility). Okay to return if pt has rehab need at discharge per facility rep (Subha). No precert needed.     Plan Comments  SW met with patient and spouse at bedside. Spouse reported pt was from facility: [facility] (P: [phone], F: [phone]) & expected to finish abx on 1/8. Liaison (Subha) reported pt is okay to return at discharge if rehab is still needed, PT eval pending. Admissions director at facility is Aileen Osborn. Pt's spouse wanted to talk with MDs on pt's plan to determine what needs will be at discharge. Requested  follow up.      DANIELLE Hou    Phone: [phone]  Cell: [phone]  Fax: [phone]  [email or web address]

## 2020-01-07 PROBLEM — T82.7XXA VASCULAR DEVICE, IMPLANT, OR GRAFT INFECTION OR INFLAMMATION (HCC): Status: ACTIVE | Noted: 2019-11-14

## 2020-01-07 PROBLEM — R78.81 BACTEREMIA DUE TO ENTEROCOCCUS: Status: ACTIVE | Noted: 2020-01-07

## 2020-01-07 PROBLEM — T83.511A URINARY TRACT INFECTION ASSOCIATED WITH INDWELLING URETHRAL CATHETER (HCC): Status: ACTIVE | Noted: 2020-01-07

## 2020-01-07 PROBLEM — B95.2 BACTEREMIA DUE TO ENTEROCOCCUS: Status: ACTIVE | Noted: 2020-01-07

## 2020-01-07 PROBLEM — N39.0 URINARY TRACT INFECTION ASSOCIATED WITH INDWELLING URETHRAL CATHETER (HCC): Status: ACTIVE | Noted: 2020-01-07

## 2020-01-07 LAB
ANION GAP SERPL CALCULATED.3IONS-SCNC: 9 MMOL/L (ref 5–15)
BACTERIA BLD CULT: ABNORMAL
BACTERIA SPEC AEROBE CULT: ABNORMAL
BASOPHILS # BLD AUTO: 0.1 10*3/MM3 (ref 0–0.2)
BASOPHILS NFR BLD AUTO: 0.7 % (ref 0–1.5)
BUN BLD-MCNC: 27 MG/DL (ref 8–23)
BUN/CREAT SERPL: 45 (ref 7–25)
CALCIUM SPEC-SCNC: 9.5 MG/DL (ref 8.6–10.5)
CHLORIDE SERPL-SCNC: 98 MMOL/L (ref 98–107)
CK SERPL-CCNC: 79 U/L (ref 20–200)
CO2 SERPL-SCNC: 27 MMOL/L (ref 22–29)
CREAT BLD-MCNC: 0.6 MG/DL (ref 0.76–1.27)
DEPRECATED RDW RBC AUTO: 58.2 FL (ref 37–54)
EOSINOPHIL # BLD AUTO: 0.1 10*3/MM3 (ref 0–0.4)
EOSINOPHIL NFR BLD AUTO: 0.6 % (ref 0.3–6.2)
ERYTHROCYTE [DISTWIDTH] IN BLOOD BY AUTOMATED COUNT: 20.7 % (ref 12.3–15.4)
GFR SERPL CREATININE-BSD FRML MDRD: 131 ML/MIN/1.73
GLUCOSE BLD-MCNC: 114 MG/DL (ref 65–99)
GLUCOSE BLDC GLUCOMTR-MCNC: 155 MG/DL (ref 70–105)
GLUCOSE BLDC GLUCOMTR-MCNC: 232 MG/DL (ref 70–105)
GLUCOSE BLDC GLUCOMTR-MCNC: 328 MG/DL (ref 70–105)
GLUCOSE BLDC GLUCOMTR-MCNC: 96 MG/DL (ref 70–105)
GRAM STN SPEC: ABNORMAL
HCT VFR BLD AUTO: 29.8 % (ref 37.5–51)
HGB BLD-MCNC: 9.6 G/DL (ref 13–17.7)
ISOLATED FROM: ABNORMAL
ISOLATED FROM: ABNORMAL
LYMPHOCYTES # BLD AUTO: 1 10*3/MM3 (ref 0.7–3.1)
LYMPHOCYTES NFR BLD AUTO: 8.1 % (ref 19.6–45.3)
MCH RBC QN AUTO: 25.8 PG (ref 26.6–33)
MCHC RBC AUTO-ENTMCNC: 32.2 G/DL (ref 31.5–35.7)
MCV RBC AUTO: 80.1 FL (ref 79–97)
MONOCYTES # BLD AUTO: 1.1 10*3/MM3 (ref 0.1–0.9)
MONOCYTES NFR BLD AUTO: 9 % (ref 5–12)
NEUTROPHILS # BLD AUTO: 10 10*3/MM3 (ref 1.7–7)
NEUTROPHILS NFR BLD AUTO: 81.6 % (ref 42.7–76)
NRBC BLD AUTO-RTO: 0.1 /100 WBC (ref 0–0.2)
PLATELET # BLD AUTO: 395 10*3/MM3 (ref 140–450)
PMV BLD AUTO: 6.6 FL (ref 6–12)
POTASSIUM BLD-SCNC: 4 MMOL/L (ref 3.5–5.2)
RBC # BLD AUTO: 3.72 10*6/MM3 (ref 4.14–5.8)
SODIUM BLD-SCNC: 134 MMOL/L (ref 136–145)
WBC NRBC COR # BLD: 12.2 10*3/MM3 (ref 3.4–10.8)

## 2020-01-07 PROCEDURE — 63710000001 INSULIN GLARGINE PER 5 UNITS: Performed by: INTERNAL MEDICINE

## 2020-01-07 PROCEDURE — 80048 BASIC METABOLIC PNL TOTAL CA: CPT | Performed by: HOSPITALIST

## 2020-01-07 PROCEDURE — 99232 SBSQ HOSP IP/OBS MODERATE 35: CPT | Performed by: INTERNAL MEDICINE

## 2020-01-07 PROCEDURE — 63710000001 INSULIN LISPRO (HUMAN) PER 5 UNITS: Performed by: INTERNAL MEDICINE

## 2020-01-07 PROCEDURE — 97112 NEUROMUSCULAR REEDUCATION: CPT

## 2020-01-07 PROCEDURE — 25010000002 DAPTOMYCIN PER 1 MG: Performed by: INTERNAL MEDICINE

## 2020-01-07 PROCEDURE — 87040 BLOOD CULTURE FOR BACTERIA: CPT | Performed by: HOSPITALIST

## 2020-01-07 PROCEDURE — 99233 SBSQ HOSP IP/OBS HIGH 50: CPT | Performed by: HOSPITALIST

## 2020-01-07 PROCEDURE — 97162 PT EVAL MOD COMPLEX 30 MIN: CPT

## 2020-01-07 PROCEDURE — 85025 COMPLETE CBC W/AUTO DIFF WBC: CPT | Performed by: HOSPITALIST

## 2020-01-07 PROCEDURE — 97530 THERAPEUTIC ACTIVITIES: CPT

## 2020-01-07 PROCEDURE — 87040 BLOOD CULTURE FOR BACTERIA: CPT | Performed by: NURSE PRACTITIONER

## 2020-01-07 PROCEDURE — 82550 ASSAY OF CK (CPK): CPT | Performed by: NURSE PRACTITIONER

## 2020-01-07 PROCEDURE — 25010000002 PIPERACILLIN SOD-TAZOBACTAM PER 1 G: Performed by: NURSE PRACTITIONER

## 2020-01-07 PROCEDURE — 82962 GLUCOSE BLOOD TEST: CPT

## 2020-01-07 RX ADMIN — METOPROLOL TARTRATE 50 MG: 50 TABLET, FILM COATED ORAL at 16:15

## 2020-01-07 RX ADMIN — DAPTOMYCIN 450 MG: 500 INJECTION, POWDER, LYOPHILIZED, FOR SOLUTION INTRAVENOUS at 12:15

## 2020-01-07 RX ADMIN — HYDROCORTISONE 1 APPLICATION: 1 OINTMENT TOPICAL at 21:01

## 2020-01-07 RX ADMIN — PANTOPRAZOLE SODIUM 40 MG: 40 TABLET, DELAYED RELEASE ORAL at 08:41

## 2020-01-07 RX ADMIN — TAMSULOSIN HYDROCHLORIDE 0.4 MG: 0.4 CAPSULE ORAL at 21:01

## 2020-01-07 RX ADMIN — PIPERACILLIN AND TAZOBACTAM 3.38 G: 3; .375 INJECTION, POWDER, LYOPHILIZED, FOR SOLUTION INTRAVENOUS at 01:42

## 2020-01-07 RX ADMIN — PIPERACILLIN AND TAZOBACTAM 3.38 G: 3; .375 INJECTION, POWDER, LYOPHILIZED, FOR SOLUTION INTRAVENOUS at 08:43

## 2020-01-07 RX ADMIN — METOPROLOL TARTRATE 50 MG: 50 TABLET, FILM COATED ORAL at 21:01

## 2020-01-07 RX ADMIN — ASPIRIN 325 MG ORAL TABLET 325 MG: 325 PILL ORAL at 08:41

## 2020-01-07 RX ADMIN — GABAPENTIN 100 MG: 100 CAPSULE ORAL at 08:43

## 2020-01-07 RX ADMIN — INSULIN LISPRO 2 UNITS: 100 INJECTION, SOLUTION INTRAVENOUS; SUBCUTANEOUS at 12:14

## 2020-01-07 RX ADMIN — ATORVASTATIN CALCIUM 40 MG: 40 TABLET, FILM COATED ORAL at 08:41

## 2020-01-07 RX ADMIN — PIPERACILLIN AND TAZOBACTAM 3.38 G: 3; .375 INJECTION, POWDER, LYOPHILIZED, FOR SOLUTION INTRAVENOUS at 16:15

## 2020-01-07 RX ADMIN — HYDROCODONE BITARTRATE AND ACETAMINOPHEN 1 TABLET: 5; 325 TABLET ORAL at 23:45

## 2020-01-07 RX ADMIN — Medication 10 ML: at 08:45

## 2020-01-07 RX ADMIN — INSULIN LISPRO 7 UNITS: 100 INJECTION, SOLUTION INTRAVENOUS; SUBCUTANEOUS at 21:00

## 2020-01-07 RX ADMIN — PIPERACILLIN AND TAZOBACTAM 3.38 G: 3; .375 INJECTION, POWDER, LYOPHILIZED, FOR SOLUTION INTRAVENOUS at 23:45

## 2020-01-07 RX ADMIN — INSULIN GLARGINE 10 UNITS: 100 INJECTION, SOLUTION SUBCUTANEOUS at 21:12

## 2020-01-07 RX ADMIN — METOPROLOL TARTRATE 50 MG: 50 TABLET, FILM COATED ORAL at 08:41

## 2020-01-07 RX ADMIN — Medication 10 ML: at 21:01

## 2020-01-07 RX ADMIN — GABAPENTIN 100 MG: 100 CAPSULE ORAL at 21:01

## 2020-01-07 RX ADMIN — INSULIN LISPRO 4 UNITS: 100 INJECTION, SOLUTION INTRAVENOUS; SUBCUTANEOUS at 17:38

## 2020-01-07 RX ADMIN — HYDROCORTISONE 1 APPLICATION: 1 OINTMENT TOPICAL at 08:42

## 2020-01-07 NOTE — THERAPY EVALUATION
Patient Name: Artur Solano  : 1945    MRN: [id number]                              Today's Date: 2020       Admit Date: 2020    Visit Dx:     ICD-10-CM ICD-9-CM   1. Chest pain, unspecified type R07.9 786.50   2. Sepsis, due to unspecified organism, unspecified whether acute organ dysfunction present (CMS/[facility]) A41.9 038.9     995.91   3. Leukocytosis, unspecified type D72.829 288.60     Patient Active Problem List   Diagnosis   • Chest pain   • Diabetes mellitus (CMS/[facility])   • Candidiasis   • Hyperlipemia   • Essential hypertension   • Peripheral vascular disease (CMS/[facility])   • GERD (gastroesophageal reflux disease)   • Coronary artery disease   • Benign prostatic disease     Past Medical History:   Diagnosis Date   • Angina at rest (CMS/[facility])    • Benign prostatic disease    • Candidiasis    • Chest pain 2020   • Constipation    • Coronary artery disease    • Cutaneous abscess     right lower limb   • CVA (cerebral vascular accident) (CMS/[facility])     years ago TIA per wife   • Diabetes mellitus (CMS/[facility])     Type 2   • Essential hypertension    • GERD (gastroesophageal reflux disease)    • Hyperlipemia    • Hypoglycemia    • Iron deficiency anemia    • Peripheral vascular disease (CMS/[facility])    • Urethral stricture    • UTI (urinary tract infection)      Past Surgical History:   Procedure Laterality Date   • ABDOMINAL AORTIC ANEURYSM REPAIR     • ABOVE KNEE LEG AMPUTATION Right    • BACK SURGERY  ,    • CARDIAC CATHETERIZATION      with stent placement   • CAROTID ENDARTERECTOMY Left    • FEMORAL ENDARTERECTOMY Bilateral    • HIP PINNING Left      General Information     Row Name 20 1601          PT Evaluation Time/Intention    Document Type  evaluation  -HD     Mode of Treatment  physical therapy  -HD     Row Name 20 1601          General Information    Patient Profile Reviewed?  yes  -HD     Prior Level of Function  -- Pt has been in/out rehab since 2019.  Prior to Nov,  pt living home with wife.  Pt had SCI L1 in 1975 and utilizes sliding board with indep for transfers.  Pt also has right AKA.  Pt has power chair, manual chair.   -HD     Existing Precautions/Restrictions  fall right AKA, SCI L1   -HD     Row Name 01/07/20 1601          Relationship/Environment    Lives With  spouse  -HD     Row Name 01/07/20 1601          Resource/Environmental Concerns    Current Living Arrangements  home/apartment/condo has been in rehab   -HD     Row Name 01/07/20 1601          Cognitive Assessment/Intervention- PT/OT    Orientation Status (Cognition)  oriented x 3  -HD     Row Name 01/07/20 1601          Safety Issues, Functional Mobility    Safety Issues Affecting Function (Mobility)  insight into deficits/self awareness;safety precaution awareness  -HD     Impairments Affecting Function (Mobility)  balance;strength;endurance/activity tolerance;postural/trunk control  -HD       User Key  (r) = Recorded By, (t) = Taken By, (c) = Cosigned By    Initials Name Provider Type    HD Alba Mireles, PT Physical Therapist        Mobility     Row Name 01/07/20 1603          Bed Mobility Assessment/Treatment    Bed Mobility Assessment/Treatment  supine-sit;sit-supine  -HD     Supine-Sit [place] (Bed Mobility)  2 person assist;moderate assist (50% patient effort)  -HD     Sit-Supine [place] (Bed Mobility)  2 person assist;minimum assist (75% patient effort)  -HD     Row Name 01/07/20 1603          Bed-Chair Transfer    Bed-Chair [place] (Transfers)  2 person assist;moderate assist (50% patient effort) using gray slide sheet, assist of two for transfer to chair with bedside chair perpendicular to bed   -HD     Row Name 01/07/20 1603          Gait/Stairs Assessment/Training    [place] Level (Gait)  unable to assess  -HD       User Key  (r) = Recorded By, (t) = Taken By, (c) = Cosigned By    Initials Name Provider Type    Alba Diaz, PT Physical Therapist        Obj/Interventions      Row Name 01/07/20 1604          General ROM    GENERAL ROM COMMENTS  BUEs WFL, left knee limited 25% knee extension PROM, right AKA  -HD     Row Name 01/07/20 1604          MMT (Manual Muscle Testing)    General MMT Comments  BUEs 4-/5 MMT, left knee 2-/5 MMT, right AKA   -HD     Row Name 01/07/20 1604          Static Sitting Balance    Level of [place] (Unsupported Sitting, Static Balance)  minimal assist, 75% patient effort  -HD     Sitting Position (Unsupported Sitting, Static Balance)  long sitting on bed  -HD     Time Able to Maintain Position (Unsupported Sitting, Static Balance)  1 to 2 minutes  -HD     Row Name 01/07/20 1604          Dynamic Sitting Balance    Level of [place], Reaches Outside Midline (Sitting, Dynamic Balance)  moderate assist, 50 to 74% patient effort;2 person assist  -HD     Sitting Position, Reaches Outside Midline (Sitting, Dynamic Balance)  -- scooting to edge of bed   -HD     Row Name 01/07/20 1604          Static Standing Balance    Level of [place] (Supported Standing, Static Balance)  unable to perform activity  -HD     Row Name 01/07/20 1604          Dynamic Standing Balance    Level of [place], Reaches Outside Midline (Standing, Dynamic Balance)  unable to perform activity  -HD       User Key  (r) = Recorded By, (t) = Taken By, (c) = Cosigned By    Initials Name Provider Type    HD Alba Mireles, PT Physical Therapist        Goals/Plan     Row Name 01/07/20 1611          Bed Mobility Goal 1 (PT)    Activity/Assistive Device (Bed Mobility Goal 1, PT)  bed mobility activities, all  -HD     [place] Level/Cues Needed (Bed Mobility Goal 1, PT)  supervision required  -HD     Time Frame (Bed Mobility Goal 1, PT)  2 weeks  -HD     Row Name 01/07/20 1611          Transfer Goal 1 (PT)    Activity/Assistive Device (Transfer Goal 1, PT)  bed-to-chair/chair-to-bed  -HD     [place] Level/Cues Needed (Transfer Goal 1, PT)  minimum assist (75% or more patient  effort)  -HD     Time Frame (Transfer Goal 1, PT)  2 weeks  -HD     Row Name 01/07/20 1611          Patient Education Goal (PT)    Activity (Patient Education Goal, PT)  Pt will be able to sit EOB x5 minutes with SBA to exhibit improved postural control.    -HD     Time Frame (Patient Education Goal, PT)  2 weeks  -HD       User Key  (r) = Recorded By, (t) = Taken By, (c) = Cosigned By    Initials Name Provider Type    Alba Diaz, PT Physical Therapist        Clinical Impression     Row Name 01/07/20 1606          Pain Assessment    Additional Documentation  Pain Scale: Numbers Pre/Post-Treatment (Group)  -HD     Row Name 01/07/20 1606          Pain Scale: Numbers Pre/Post-Treatment    Pain Scale: Numbers, Pretreatment  0/10 - no pain  -HD     Pain Scale: Numbers, Post-Treatment  0/10 - no pain  -HD     Row Name 01/07/20 1606          Physical Therapy Clinical Impression    Patient/Family Goals Statement (PT Clinical Impression)  Pt is 73 yo male admitted from rehab for chest pain, sepsis, leukocytosis.  Cardiology recommending conservative medical management.  Pt has right AKA and hx L1 SCI in 1975 per pt report.    -HD     Criteria for Skilled Interventions Met (PT Clinical Impression)  yes;treatment indicated  -HD     Rehab Potential (PT Clinical Summary)  fair, will monitor progress closely  -HD     Predicted Duration of Therapy (PT)  2 weeks   -HD     Row Name 01/07/20 1606          Positioning and Restraints    Pre-Treatment Position  in bed  -HD     Post Treatment Position  bed  -HD     In Bed  notified nsg;encouraged to call for assist;call light within reach;exit alarm on;with family/caregiver  -HD       User Key  (r) = Recorded By, (t) = Taken By, (c) = Cosigned By    Initials Name Provider Type    Alba Diaz, PT Physical Therapist        Outcome Measures     Row Name 01/07/20 1614          How much help from another person do you currently need...    Turning from your back to your side while  in flat bed without using bedrails?  2  -HD     Moving from lying on back to sitting on the side of a flat bed without bedrails?  2  -HD     Moving to and from a bed to a chair (including a wheelchair)?  2  -HD     Standing up from a chair using your arms (e.g., wheelchair, bedside chair)?  1  -HD     Climbing 3-5 steps with a railing?  1  -HD     To walk in hospital room?  1  -HD     AM-PAC 6 Clicks Score (PT)  9  -HD     Row Name 01/07/20 1614          Functional Assessment    Outcome Measure Options  AM-PAC 6 Clicks Basic Mobility (PT)  -HD       User Key  (r) = Recorded By, (t) = Taken By, (c) = Cosigned By    Initials Name Provider Type    Alba Diaz, PT Physical Therapist          PT Recommendation and Plan     Outcome Summary/Treatment Plan (PT)  Anticipated Discharge Disposition (PT): inpatient rehabilitation facility  Plan of Care Reviewed With: patient, spouse  Outcome Summary: Pt is 73 yo male admitted from rehab for chest pain, sepsis, leukocytosis.  Cardiology recommending conservative medical management.  Pt has right AKA and hx L1 SCI in 1975 per pt report.  Pt requiring assist of two for safety with bed mobility and scooting transfer bed <-> chair with chair perpendicular to bed.  Pt also requiring assist of two for postural control with dynamic sitting balance due to impaired balance.  Pt remains far from mobility baseline of completing lateral transfes with sliding board indep.  PT recommends return to IP rehab to increase indep with mobility.  PT will follow 3x/week while at [facility].     Time Calculation:   PT Charges     Row Name 01/07/20 1620             Time Calculation    Start Time  1336  -HD      Stop Time  1417  -HD      Time Calculation (min)  41 min  -HD      PT Received On  01/07/20  -HD      PT - Next Appointment  01/08/20  -HD      PT Goal Re-Cert Due Date  01/21/20  -HD         Time Calculation- PT    Total Timed Code Minutes- PT  20 minute(s)  -HD        User Key  (r) = Recorded  By, (t) = Taken By, (c) = Cosigned By    Initials Name Provider Type    HD Alba Mireles, PT Physical Therapist        Therapy Charges for Today     Code Description Service Date Service Provider Modifiers Qty    80247337282 HC PT EVAL MOD COMPLEXITY 3 1/7/2020 Alba Mireles, PT GP 1    81268427832 HC PT THERAPEUTIC ACT EA 15 MIN 1/7/2020 Alba Mireles, PT GP 1    12299839742 HC PT NEUROMUSC RE EDUCATION EA 15 MIN 1/7/2020 Alba Mireles, PT GP 1          PT G-Codes  Outcome Measure Options: AM-PAC 6 Clicks Basic Mobility (PT)  AM-PAC 6 Clicks Score (PT): 9    Alba Mireles PT  1/7/2020

## 2020-01-07 NOTE — NURSING NOTE
RN entered room to patients spouse yelling at vascular PA. Patients wife was extremely angry and asking that our vascular team does not see that patient at all. Will notify vascular team.

## 2020-01-07 NOTE — PROGRESS NOTES
Urology Progress Note    Patient Identification:  Name:  Artur Solano  Age:  74 y.o.  Sex:  male  :  1945  MRN:  [id number]    Chief Complaint: Patient without complaint    History of Present Illness: CT scan shows few small bilateral renal cysts.  No evidence for renal abscess or pyelonephritis.  No evidence for obstruction with a left stent in place.  Urine culture reveals E. coli which is sensitive to the cephalosporins and Zosyn as well as Macrobid.  Blood cultures have been growing enterococcus.  Antibiotics per infectious disease.    Problem List:  Patient Active Problem List   Diagnosis   • Chest pain   • Diabetes mellitus (CMS/[facility])   • Candidiasis   • Hyperlipemia   • Essential hypertension   • Peripheral vascular disease (CMS/[facility])   • GERD (gastroesophageal reflux disease)   • Coronary artery disease   • Benign prostatic disease     Past Medical History:  Past Medical History:   Diagnosis Date   • Angina at rest (CMS/[facility])    • Benign prostatic disease    • Candidiasis    • Chest pain 2020   • Constipation    • Coronary artery disease    • Cutaneous abscess     right lower limb   • CVA (cerebral vascular accident) (CMS/[facility])     years ago TIA per wife   • Diabetes mellitus (CMS/[facility])     Type 2   • Essential hypertension    • GERD (gastroesophageal reflux disease)    • Hyperlipemia    • Hypoglycemia    • Iron deficiency anemia    • Peripheral vascular disease (CMS/[facility])    • Urethral stricture    • UTI (urinary tract infection)      Past Surgical History:  Past Surgical History:   Procedure Laterality Date   • ABDOMINAL AORTIC ANEURYSM REPAIR     • ABOVE KNEE LEG AMPUTATION Right    • BACK SURGERY  ,    • CARDIAC CATHETERIZATION      with stent placement   • CAROTID ENDARTERECTOMY Left    • FEMORAL ENDARTERECTOMY Bilateral    • HIP PINNING Left      Home Meds:  Medications Prior to Admission   Medication Sig Dispense Refill Last Dose   • anidulafungin (ERAXIS) 100 MG injection Infuse  100 mg into a venous catheter Every Night. 34 total days; last day Wednesday, 1/8/20      • aspirin 325 MG tablet Take 325 mg by mouth Daily.      • atorvastatin (LIPITOR) 40 MG tablet Take 40 mg by mouth Daily.      • Cranberry-Vitamin C-Probiotic (AZO CRANBERRY) 250-30 MG tablet Take 1 tablet by mouth Daily As Needed (bladder spasms).      • famotidine (PEPCID) 20 MG tablet Take 20 mg by mouth 2 (Two) Times a Day As Needed.      • gabapentin (NEURONTIN) 100 MG capsule Take 100 mg by mouth 2 (Two) Times a Day.      • glipizide (GLUCOTROL) 10 MG tablet Take 10 mg by mouth 2 (Two) Times a Day Before Meals.      • hydrocortisone 1 % ointment Apply 1 application topically to the appropriate area as directed 2 (Two) Times a Day As Needed.      • insulin detemir (LEVEMIR) 100 UNIT/ML injection Inject 20 Units under the skin into the appropriate area as directed Every Morning.      • ipratropium-albuterol (DUO-NEB) 0.5-2.5 mg/3 ml nebulizer Take 3 mL by nebulization 3 (Three) Times a Day As Needed for Wheezing.      • metFORMIN (GLUCOPHAGE) 1000 MG tablet Take 1,000 mg by mouth 2 (Two) Times a Day With Meals.      • metoprolol tartrate (LOPRESSOR) 50 MG tablet Take 50 mg by mouth 2 (Two) Times a Day.      • nitroglycerin (NITROSTAT) 0.4 MG SL tablet Place 0.4 mg under the tongue Every 5 (Five) Minutes As Needed for Chest Pain. Take no more than 3 doses in 15 minutes.      • SITagliptin (JANUVIA) 100 MG tablet Take 100 mg by mouth Every Night.      • tamsulosin (FLOMAX) 0.4 MG capsule 24 hr capsule Take 0.4 mg by mouth Daily.        Current Meds:    Current Facility-Administered Medications:   •  aspirin tablet 325 mg, 325 mg, Oral, Daily, Jarocho Pickard MD, 325 mg at 01/06/20 1424  •  atorvastatin (LIPITOR) tablet 40 mg, 40 mg, Oral, Daily, Jarocho Pickard MD, 40 mg at 01/06/20 1424  •  DAPTOmycin (CUBICIN) 450 mg in sodium chloride 0.9 % 50 mL IVPB, 6 mg/kg, Intravenous, Q24H, Jack Ling MD, Last Rate: 100  mL/hr at 01/06/20 1424, 450 mg at 01/06/20 1424  •  dextrose (D50W) 25 g/ 50mL Intravenous Solution 25 g, 25 g, Intravenous, Q15 Min PRN, Jarocho Pickard MD  •  dextrose (GLUTOSE) oral gel 15 g, 15 g, Oral, Q15 Min PRN, Jarocho Pickard MD  •  docusate sodium (COLACE) capsule 100 mg, 100 mg, Oral, BID PRN, Jarocho Pickrad MD  •  gabapentin (NEURONTIN) capsule 100 mg, 100 mg, Oral, BID, Jarocho Pickard MD, 100 mg at 01/06/20 2114  •  glucagon (human recombinant) (GLUCAGEN DIAGNOSTIC) injection 1 mg, 1 mg, Subcutaneous, Q15 Min PRN, Jarocho Pickard MD  •  HYDROcodone-acetaminophen (NORCO) 5-325 MG per tablet 1 tablet, 1 tablet, Oral, Q6H PRN, Jarocho Pickard MD, 1 tablet at 01/05/20 2157  •  hydrocortisone 1 % ointment 1 application, 1 application, Topical, Q12H, Jarocho Pickard MD, 1 application at 01/06/20 2118  •  insulin glargine (LANTUS) injection 10 Units, 10 Units, Subcutaneous, Nightly, Jarocho Pickard MD, 10 Units at 01/06/20 2115  •  insulin lispro (humaLOG) injection 0-9 Units, 0-9 Units, Subcutaneous, 4x Daily With Meals & Nightly, 7 Units at 01/06/20 2114 **AND** insulin lispro (humaLOG) injection 0-9 Units, 0-9 Units, Subcutaneous, PRN, Jarocho Pickard MD  •  ipratropium-albuterol (DUO-NEB) nebulizer solution 3 mL, 3 mL, Nebulization, TID PRN, Jarocho Pickard MD  •  ketamine (KETALAR) 21 mg in sodium chloride 0.9 % 100 mL infusion, 0.3 mg/kg, Intravenous, Daily PRN **AND** Ketamine Vital Signs & Assessment, , , Per Order Details, Jarocho Pickard MD  •  metoprolol tartrate (LOPRESSOR) tablet 50 mg, 50 mg, Oral, TID, Falguni Ng MD, 50 mg at 01/06/20 2114  •  nitroglycerin (NITROSTAT) SL tablet 0.4 mg, 0.4 mg, Sublingual, Q5 Min PRN, Jarocho Pickard MD  •  ondansetron (ZOFRAN) tablet 4 mg, 4 mg, Oral, Q6H PRN **OR** ondansetron (ZOFRAN) injection 4 mg, 4 mg, Intravenous, Q6H PRN, Jarocho Pickard MD  •  pantoprazole (PROTONIX) EC  "tablet 40 mg, 40 mg, Oral, QAM, Jarocho Pickard MD  •  piperacillin-tazobactam (ZOSYN) IVPB 3.375 g in 100 mL NS (CD), 3.375 g, Intravenous, Q8H, Kindra Hopkins APRN, 3.375 g at 20 0142  •  [COMPLETED] Insert peripheral IV, , , Once **AND** sodium chloride 0.9 % flush 10 mL, 10 mL, Intravenous, PRN, Jarocho Pickard MD  •  sodium chloride 0.9 % flush 10 mL, 10 mL, Intravenous, Q12H, Jarocho Pickard MD, 10 mL at 20  •  sodium chloride 0.9 % flush 10 mL, 10 mL, Intravenous, PRN, Jarocho Pickard MD  •  tamsulosin (FLOMAX) 24 hr capsule 0.4 mg, 0.4 mg, Oral, Nightly, Jarocho Pickard MD, 0.4 mg at 20  Allergies:  Keflex [cephalexin] and Lisinopril    Review of Systems no fevers or chills.  No congestion or nasal discharge.    Objective:  tMax 24 hours:  Temp (24hrs), Av.7 °F (37.1 °C), Min:98.4 °F (36.9 °C), Max:98.9 °F (37.2 °C)    Vital Sign Ranges:  Temp:  [98.4 °F (36.9 °C)-98.9 °F (37.2 °C)] 98.8 °F (37.1 °C)  Heart Rate:  [] 103  Resp:  [12-20] 18  BP: (111-141)/(65-89) 111/72  Intake and Output Last 3 Shifts:  I/O last 3 completed shifts:  In: 1430 [P.O.:180; I.V.:250; IV Piggyback:1000]  Out: 1150 [Urine:1150]    Exam:  /72   Pulse 103   Temp 98.8 °F (37.1 °C) (Oral)   Resp 18   Ht 180.3 cm (71\")   Wt 71.1 kg (156 lb 12 oz)   SpO2 96%   BMI 21.86 kg/m²    General Appearance:    Alert, cooperative, no acute distress, general         appearance is normal   Head:    Normocephalic, without obvious abnormality, atraumatic   Eyes:            Pupils/Irises normal. Exterior inspection conjunctivae       and lids normal.   Ears:    Normal external inspection   Nose:   Exterior inspection of nose is normal   Throat:   Lips, mucosa, and tongue normal   Lungs:     Respirations unlabored; normal effort, no audible     abnormality   CV:   Regular rhythm and normal rate, no edema   Abdomen:     examination of the abdomen is normal with     no " masses, tenderness, or distension    :  Cedeno with yellow urine     Data Review:  All labs (24hrs):    Lab Results (last 24 hours)     Procedure Component Value Units Date/Time    CK [[id number]]  (Normal) Collected:  01/07/20 0603    Specimen:  Blood Updated:  01/07/20 0700     Creatine Kinase 79 U/L     CBC & Differential [[id number]] Collected:  01/07/20 0603    Specimen:  Blood Updated:  01/07/20 0638    Narrative:       The following orders were created for panel order CBC & Differential.  Procedure                               Abnormality         Status                     ---------                               -----------         ------                     CBC Auto Differential[[id number]]        Abnormal            Final result                 Please view results for these tests on the individual orders.    CBC Auto Differential [[id number]]  (Abnormal) Collected:  01/07/20 0603    Specimen:  Blood Updated:  01/07/20 0638     WBC 12.20 10*3/mm3      RBC 3.72 10*6/mm3      Hemoglobin 9.6 g/dL      Hematocrit 29.8 %      MCV 80.1 fL      MCH 25.8 pg      MCHC 32.2 g/dL      RDW 20.7 %      RDW-SD 58.2 fl      MPV 6.6 fL      Platelets 395 10*3/mm3      Neutrophil % 81.6 %      Lymphocyte % 8.1 %      Monocyte % 9.0 %      Eosinophil % 0.6 %      Basophil % 0.7 %      Neutrophils, Absolute 10.00 10*3/mm3      Lymphocytes, Absolute 1.00 10*3/mm3      Monocytes, Absolute 1.10 10*3/mm3      Eosinophils, Absolute 0.10 10*3/mm3      Basophils, Absolute 0.10 10*3/mm3      nRBC 0.1 /100 WBC     Basic Metabolic Panel [[id number]] Collected:  01/07/20 0603    Specimen:  Blood Updated:  01/07/20 0634    Blood Culture - Blood, Blood, Venous Line [[id number]]  (Abnormal) Collected:  01/06/20 1011    Specimen:  Blood, Venous Line Updated:  01/07/20 0328     Blood Culture Abnormal Stain     Gram Stain Anaerobic Bottle Gram positive cocci in chains      Aerobic Bottle Gram positive cocci in chains    Blood Culture ID, PCR -  Blood, Blood, Venous Line [[id number]]  (Abnormal) Collected:  01/06/20 1011    Specimen:  Blood, Venous Line Updated:  01/07/20 0328     BCID, PCR Enterococcus spp. Identification by BCID PCR.    Urine Culture - Urine, Urine, Catheter [[id number]]  (Abnormal)  (Susceptibility) Collected:  01/05/20 1436    Specimen:  Urine, Catheter Updated:  01/07/20 0125     Urine Culture >100,000 CFU/mL Escherichia coli    Susceptibility      Escherichia coli     SUNG     Ampicillin Resistant     Ampicillin + Sulbactam Intermediate     Cefazolin Susceptible     Cefepime Susceptible     Ceftazidime Susceptible     Ceftriaxone Susceptible     Gentamicin Resistant     Levofloxacin Intermediate     Nitrofurantoin Susceptible     Piperacillin + Tazobactam Susceptible     Tetracycline Resistant     Trimethoprim + Sulfamethoxazole Resistant                    Blood Culture - Blood, Arm, Left [[id number]]  (Abnormal) Collected:  01/05/20 1421    Specimen:  Blood from Arm, Left Updated:  01/06/20 2051     Blood Culture Enterococcus species     Isolated from Aerobic and Anaerobic Bottles     Gram Stain Aerobic Bottle Gram positive cocci in chains      Anaerobic Bottle Gram positive cocci in chains    POC Glucose Once [[id number]]  (Abnormal) Collected:  01/06/20 2012    Specimen:  Blood Updated:  01/06/20 2014     Glucose 312 mg/dL      Comment: Serial Number: [id number]:  [id number]       POC Glucose Once [[id number]]  (Abnormal) Collected:  01/06/20 1658    Specimen:  Blood Updated:  01/06/20 1706     Glucose 222 mg/dL      Comment: Serial Number: [id number]:  [id number]       POC Glucose Once [[id number]]  (Abnormal) Collected:  01/06/20 1133    Specimen:  Blood Updated:  01/06/20 1213     Glucose 263 mg/dL      Comment: Serial Number: [id number]:  [id number]       CK [[id number]]  (Normal) Collected:  01/06/20 1011    Specimen:  Blood Updated:  01/06/20 1116     Creatine Kinase 102 U/L     Lipid Panel [[id number]]  (Abnormal)  Collected:  01/06/20 0613    Specimen:  Blood Updated:  01/06/20 0954     Total Cholesterol 121 mg/dL      Triglycerides 78 mg/dL      HDL Cholesterol 34 mg/dL      LDL Cholesterol  71 mg/dL      VLDL Cholesterol 15.6 mg/dL      LDL/HDL Ratio 2.10    Narrative:       Cholesterol Reference Ranges  (U.S. Department of Health and Human Services ATP III Classifications)    Desirable          <200 mg/dL  Borderline High    200-239 mg/dL  High Risk          >240 mg/dL      Triglyceride Reference Ranges  (U.S. Department of Health and Human Services ATP III Classifications)    Normal           <150 mg/dL  Borderline High  150-199 mg/dL  High             200-499 mg/dL  Very High        >500 mg/dL    HDL Reference Ranges  (U.S. Department of Health and Human Services ATP III Classifcations)    Low     <40 mg/dl (major risk factor for CHD)  High    >60 mg/dl ('negative' risk factor for CHD)        LDL Reference Ranges  (U.S. Department of Health and Human Services ATP III Classifcations)    Optimal          <100 mg/dL  Near Optimal     100-129 mg/dL  Borderline High  130-159 mg/dL  High             160-189 mg/dL  Very High        >189 mg/dL    Troponin [[id number]]  (Abnormal) Collected:  01/06/20 0613    Specimen:  Blood Updated:  01/06/20 0905     Troponin T 0.463 ng/mL     Narrative:       Troponin T Reference Range:  <= 0.03 ng/mL-   Negative for AMI  >0.03 ng/mL-     Abnormal for myocardial necrosis.  Clinicians would have to utilize clinical acumen, EKG, Troponin and serial changes to determine if it is an Acute Myocardial Infarction or myocardial injury due to an underlying chronic condition.     Basic Metabolic Panel [[id number]]  (Abnormal) Collected:  01/06/20 0613    Specimen:  Blood Updated:  01/06/20 0843     Glucose 380 mg/dL      BUN 37 mg/dL      Creatinine 0.73 mg/dL      Sodium 130 mmol/L      Potassium 5.2 mmol/L      Chloride 95 mmol/L      CO2 21.0 mmol/L      Calcium 9.2 mg/dL      eGFR Non African Amer  105 mL/min/1.73      BUN/Creatinine Ratio 50.7     Anion Gap 14.0 mmol/L     Narrative:       GFR Normal >60  Chronic Kidney Disease <60  Kidney Failure <15      CK [[id number]]  (Normal) Collected:  01/06/20 0613    Specimen:  Blood Updated:  01/06/20 0842     Creatine Kinase 94 U/L     Magnesium [[id number]]  (Abnormal) Collected:  01/06/20 0613    Specimen:  Blood Updated:  01/06/20 0842     Magnesium 2.5 mg/dL     Phosphorus [[id number]]  (Normal) Collected:  01/06/20 0613    Specimen:  Blood Updated:  01/06/20 0842     Phosphorus 4.3 mg/dL     TSH [[id number]]  (Normal) Collected:  01/06/20 0613    Specimen:  Blood Updated:  01/06/20 0838     TSH 3.990 uIU/mL     Calcium, Ionized [[id number]]  (Normal) Collected:  01/06/20 0613    Specimen:  Blood Updated:  01/06/20 0816     Ionized Calcium 1.27 mmol/L     POC Glucose Once [[id number]]  (Abnormal) Collected:  01/06/20 0750    Specimen:  Blood Updated:  01/06/20 0752     Glucose 305 mg/dL      Comment: Serial Number: [id number]:  [id number]       Blood Culture - Blood, Hand, Left [[id number]]  (Abnormal) Collected:  01/05/20 1430    Specimen:  Blood from Hand, Left Updated:  01/06/20 0734     Blood Culture Abnormal Stain     Gram Stain Anaerobic Bottle Gram positive cocci in chains      Aerobic Bottle Gram positive cocci in chains        Radiology:   Imaging Results (Last 72 Hours)     Procedure Component Value Units Date/Time    CT Abdomen Pelvis With & Without Contrast [[id number]] Collected:  01/06/20 2303     Updated:  01/06/20 2320    Narrative:          DATE OF EXAM:  1/6/2020 10:43 PM     PROCEDURE:  CT ABDOMEN PELVIS W WO CONTRAST-     INDICATIONS:  UTI; L ureteral stricture s/p stent; R07.9-Chest pain, unspecified;  A41.9-Sepsis, unspecified organism; D72.829-Elevated white blood cell  count, unspecified     COMPARISON:   No Comparisons Available     TECHNIQUE:   Multiple axial images were obtained through the abdomen and pelvis  without the  administration of contrast.  Additional scanning through the  abdomen and pelvis followed by the  intravenous administration of 100 mL  of Isovue 370. Reconstructed coronal and sagittal images were also  obtained. Automated exposure control and iterative construction methods  were used.     FINDINGS:  There are small bilateral pleural effusions, right greater than left.   There is bilateral lower lobe airspace disease, right greater than left  likely due to atelectasis.  Superimposed pneumonia cannot be entirely  excluded.  There is extensive coronary artery calcification.  There is  also aortic valvular calcification.  There is small pericardial  effusion.  There is an irregular shaped left ventricular aneurysm  arising from the posterior inferior surface of the left ventricle.   There are extensive renal vascular calcifications.  No definite renal or  ureteral stone identified.  There is a left-sided ureteral stent which  appears to be appropriately positioned within the left renal pelvis with  the distal portion of the stent within the urinary bladder.  There is no  hydronephrosis.  There is a 1.4 cm low-density mass of the upper pole  the right kidney compatible with a cyst.  Multiple smaller low density  masses are noted of both kidneys also compatible with cyst.  No  right-sided hydronephrosis is seen.  Bilateral adrenal glands are within  normal limits.  Liver and pancreas are within normal limits.  There is a  well-circumscribed low-density 1 7 m mass centrally within the spleen  likely a cyst or possibly a hemangioma.  There are splenic granulomas.   Gallbladder is at the upper limits of normal in size.  No inflammatory  change seen around the gallbladder.  Upper GI tract appears within  normal limits.  There is atherosclerotic vascular calcification of the  aorta and abdominal branch vessels.  There is approximately 50% stenosis  of the celiac artery.  Patient is status post aorto bifemoral bypass  graft  placement.  There appears to be complete occlusion of the native  aorta and graft just below the renal arteries.  The low left common  femoral artery appears to be reconstituted by small peripheral  collaterals.  Right common femoral artery does not appear to be  reconstituted during this phase of imaging.  There appears to be a  pseudoaneurysm at the left common femoral anastomosis.  Pseudoaneurysm  measures proximal a 2.4 cm in size.     Pelvis: Cedeno catheter is in place within the urinary bladder which is  decompressed.  There is a small amount of free fluid in the pelvis of  uncertain etiology.  GI tract appears within normal limits.  The  appendix appears normal.  No pelvic or inguinal adenopathy identified.   There is marked muscular atrophy of the gluteal and thigh muscles.   There are postoperative changes of the left femur related to prior  fracture repair.  There are old healed fractures of the superior and  inferior pubic rami bilaterally.  There are severe degenerative changes  of the hips and lumbar spine.  Patient is status post fusion from the  L1-L5 levels.  There are also posterior fusion rods and laminar hooks  within the lower thoracic spine.  No lytic or sclerotic bony lesion  identified.       Impression:          1.  Left-sided ureteral stent appropriately positioned.  No evidence of  hydronephrosis.  No definite renal or ureteral stone identified.  2.  Status post distal aorto bifemoral bypass graft placement.  The  graft appears to be completely occluded just below the level of the  renal arteries.  There is a pseudoaneurysm measuring 2.4 cm in size of  the left common femoral anastomosis.  Portion of the left common femoral  artery appears to be reconstituted by multiple small collaterals.  The  right common femoral artery does not appear to be reconstituted.  3.  Moderate-sized left ventricular aneurysm  4.  Severe muscular atrophy of the pelvis and upper thighs  5.  Bilateral pleural  effusions and bilateral lower lobe airspace  disease  6.  Small pericardial effusion.     Electronically Signed By-Ted Roberts On:1/6/2020 11:18 PM  This report was finalized on [id number] by  Ted Roberts, .    XR Chest 1 View [[id number]] Collected:  01/05/20 1309     Updated:  01/05/20 1312    Narrative:       DATE OF EXAM:  1/5/2020 12:59 PM     PROCEDURE:  XR CHEST 1 VW-     INDICATIONS:  Chest pain, past tobacco abuse.      COMPARISON:  No Comparisons Available     TECHNIQUE:   Single radiographic view of the chest was obtained.     FINDINGS:  The heart size is normal. The lung volumes are diminished. The pulmonary  vascular markings are felt to be normal. The lungs and pleural spaces  are clear of active disease. There is a left upper extremity PICC line  in place with the tip terminating in superior vena cava.  There are  chronic age-related changes involving the bony thorax and thoracic  aorta. There is fusion hardware seen within the visualized lower  thoracic spine.       Impression:          1. Low lung volumes.  2. No active pulmonary disease.     Electronically Signed By-John Chavira On:1/5/2020 1:10 PM  This report was finalized on [id number] by  John Chavira, .          Assessment/Plan:    Active Problems:    Chest pain    Diabetes mellitus (CMS/HCC)    Candidiasis    Hyperlipemia    Essential hypertension    Peripheral vascular disease (CMS/HCC)    GERD (gastroesophageal reflux disease)    Coronary artery disease    Benign prostatic disease    Left ureteral stricture managed with an indwelling stent.  This is to be changed in approximately 1 month.  No evidence of obstruction at this time.  Urinary retention which is being managed with an indwelling Cedeno catheter that was changed 1 week ago.  UTI with E. Coli  Bacteremia with enterococcus    Plan  Antibiotics per infectious disease  Patient to follow-up with my partner Dr. Pepe in 1 to 2 weeks to further discuss left stent exchange  versus removal.  No indication for intervention at this time.  I will sign off.  Please let me know if I can be of further assistance.        Matti Miller MD  1/7/2020  7:00 AM

## 2020-01-07 NOTE — PLAN OF CARE
Problem: Patient Care Overview  Goal: Plan of Care Review  Outcome: Ongoing (interventions implemented as appropriate)  Flowsheets (Taken 1/7/2020 0563)  Plan of Care Reviewed With: patient; spouse  Outcome Summary: Pt is 73 yo male admitted from rehab for chest pain, sepsis, leukocytosis.  Cardiology recommending conservative medical management.  Pt has right AKA and hx L1 SCI in 1975 per pt report.  Pt requiring assist of two for safety with bed mobility and scooting transfer bed <-> chair with chair perpendicular to bed.  Pt also requiring assist of two for postural control with dynamic sitting balance due to impaired balance.  Pt remains far from mobility baseline of completing lateral transfes with sliding board indep.  PT recommends return to  rehab to increase indep with mobility.  PT will follow 3x/week while at [facility].

## 2020-01-07 NOTE — PROGRESS NOTES
"Patient's wife declines to be seen by vascular surgery at this point.  However, I have reviewed the chart extensively.  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  There is still a prosthetic graft in the abdomen that is no longer functional.  If the concern is for an ongoing prosthetic graft infection, would recommend transfer to the facility that did his operations less than a month ago.    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 He has undergone multiple debridements which have grown Candida glabrata. CT showed aneurysmal degeneration of the previous vein bypass graft in the groin and fluid around the distal limb of the graft. He required debridement and excision of infected prosthetic graft. \"      "

## 2020-01-07 NOTE — PROGRESS NOTES
Cardiology Progress  Note      Patient Care Team:  Jimy Lopez MD as PCP - General (Family Medicine)     Cardiology assessment and plan    Chest pain atypical  Coronary artery disease  Ischemic cardiomyopathy  Echocardiogram with estimated LV ejection fraction of 35%  EKG with sinus tachycardia incomplete left bundle branch block and ST-T wave changes in the inferolateral leads  Mild nonspecific elevation of troponin  Sepsis  Hypertension  Peripheral vascular disease  Hyperlipidemia  History of prostate cancer  Frail status with multiple comorbidities  Enterococcal bacteremia  Urinary tract infection with E. Coli  Multiple peripheral vascular surgeries  Recent cardiac catheterization at [place] with significant disease involving the PLB branch of the right coronary artery because of the small size of the vessel currently being medically managed     Test results and treatment options discussed with the patient and family  Cath report from [facility] that was done in  patient had 100% occlusion of the left circumflex artery which was a  there was a significant disease in the PLV branch which was felt to be small in size and was left for medical therapy  Discussed with the family and patient at bedside plan to manage conservatively at this time  Continue aspirin statin beta-blocker  Recommend DVT prophylaxis if there is no contraindication  Further recommendations based on patient hospital course              PATIENT IDENTIFICATION    Name: Artur Solano Age: 74 y.o. Sex: male :  1945 MRN: [id number]    REASON FOR FOLLOW-UP:  Atypical chest pain  Ischemic cardiomyopathy with echocardiogram findings of EF 35%  Mild, nonspecific troponin elevation        SUBJECTIVE    Patient denies any further chest discomfort  Denies any shortness of breath  No pain reported      REVIEW OF SYSTEMS:  Pertinent items are noted in HPI, all other systems reviewed and negative  Patient  "denies any headache no nausea vomiting diarrhea no bruises no melena no abdominal pain no dizziness no syncope  OBJECTIVE   Lying in bed and appears comfortable    ASSESSMENT/PLAN    Chest pain    Diabetes mellitus (CMS/HCC)    Candidiasis    Hyperlipemia    Essential hypertension    Peripheral vascular disease (CMS/HCC)    GERD (gastroesophageal reflux disease)    Coronary artery disease    Benign prostatic disease    Chest pain atypical  Coronary artery disease  Ischemic cardiomyopathy  Echocardiogram with estimated LV ejection fraction of 35%  EKG with sinus tachycardia incomplete left bundle branch block and ST-T wave changes in the inferolateral leads  Mild nonspecific elevation of troponin  Sepsis  Hypertension  Peripheral vascular disease  Hyperlipidemia  History of prostate cancer  Frail status with multiple comorbidities  Enterococcal bacteremia  Urinary tract infection with E. Coli  Multiple peripheral vascular surgeries  Recent cardiac catheterization at [place] with significant disease involving the PLB branch of the right coronary artery because of the small size of the vessel currently being medically managed    Plan:  Test results and treatment options discussed with the patient and family  Medical records from recent hospitalization at [facility] reviewed by cardiologist.  No PCI performed during heart cath.  Plan to manage patient conservatively from cardiac standpoint at this time  Rhythm sinus  Further recommendations based on patient hospital course        Vital Signs  Visit Vitals  /72   Pulse 103   Temp 98.8 °F (37.1 °C) (Oral)   Resp 18   Ht 180.3 cm (71\")   Wt 71.1 kg (156 lb 12 oz)   SpO2 96%   BMI 21.86 kg/m²     Oxygen Therapy  SpO2: 96 %  Pulse Oximetry Type: Intermittent  Device (Oxygen Therapy): room air  Flowsheet Rows      First Filed Value   Admission Height  180.3 cm (71\") Documented at 01/05/2020 1145   Admission Weight  70.3 kg (155 lb) Documented at 01/05/2020 1145    " "    Intake & Output (last 3 days)       01/04 0701 - 01/05 0700 01/05 0701 - 01/06 0700 01/06 0701 - 01/07 0700 01/07 0701 - 01/08 0700    P.O.   420     I.V. (mL/kg)  250 (3.5)      IV Piggyback  1000      Total Intake(mL/kg)  1250 (17.6) 420 (5.9)     Urine (mL/kg/hr)  400 1850 (1.1)     Stool  0      Total Output  400 1850     Net  +850 -1430             Stool Unmeasured Occurrence  1 x          Lines, Drains & Airways    Active LDAs     Name:   Placement date:   Placement time:   Site:   Days:    PICC Single Lumen 01/05/20 Left   01/05/20    1155    --   1    Urethral Catheter   01/05/20 PRESENT UPON ARRIVAL     1157     1                       /72   Pulse 103   Temp 98.8 °F (37.1 °C) (Oral)   Resp 18   Ht 180.3 cm (71\")   Wt 71.1 kg (156 lb 12 oz)   SpO2 96%   BMI 21.86 kg/m²   Intake/Output last 3 shifts:  I/O last 3 completed shifts:  In: 420 [P.O.:420]  Out: 2250 [Urine:2250]  Intake/Output this shift:  No intake/output data recorded.    PHYSICAL EXAM:    General: Well-developed, well-nourished 74-year-old  male who is alert, cooperative, no distress, appears stated age  Head:  Normocephalic, atraumatic, mucous membranes moist  Eyes:  Conjunctiva/corneas clear, EOM's intact     Neck:  Supple,  no adenopathy;      Lungs: Clear to auscultation bilaterally, no wheezes rhonchi rales are noted  Chest wall: No tenderness  Heart::  Regular rate and rhythm, S1 and S2 normal, no murmur, rub or gallop  Abdomen: Soft, non-tender, nondistended bowel sounds active  Extremities: Right above-the-knee amputation, left lower extremity with severe muscle atrophy, dressing to foot  Pulses: 2+ radial pulses  Skin:  No rashes or lesions  Neuro/psych: A&O x3. CN II through XII are grossly intact with appropriate affect      Scheduled Meds:        aspirin 325 mg Oral Daily   atorvastatin 40 mg Oral Daily   DAPTOmycin 6 mg/kg Intravenous Q24H   gabapentin 100 mg Oral BID   hydrocortisone 1 application Topical " Q12H   insulin glargine 10 Units Subcutaneous Nightly   insulin lispro 0-9 Units Subcutaneous 4x Daily With Meals & Nightly   metoprolol tartrate 50 mg Oral TID   pantoprazole 40 mg Oral QAM   piperacillin-tazobactam 3.375 g Intravenous Q8H   sodium chloride 10 mL Intravenous Q12H   tamsulosin 0.4 mg Oral Nightly       Continuous Infusions:         PRN Meds:    dextrose  •  dextrose  •  docusate sodium  •  glucagon (human recombinant)  •  HYDROcodone-acetaminophen  •  insulin lispro **AND** insulin lispro  •  ipratropium-albuterol  •  ketamine (KETALAR) infusion **AND** Ketamine Vital Signs & Assessment  •  nitroglycerin  •  ondansetron **OR** ondansetron  •  [COMPLETED] Insert peripheral IV **AND** sodium chloride  •  sodium chloride        Results Review:     I reviewed the patient's new clinical results.    CBC    Results from last 7 days   Lab Units 01/07/20  0603 01/06/20  0606 01/05/20  1227   WBC 10*3/mm3 12.20* 14.00* 21.10*   HEMOGLOBIN g/dL 9.6* 9.7* 10.9*   PLATELETS 10*3/mm3 395 468* 487*     Cr Clearance Estimated Creatinine Clearance: 80.2 mL/min (A) (by C-G formula based on SCr of 0.6 mg/dL (L)).  Coag   Results from last 7 days   Lab Units 01/05/20  1227   INR  1.03   APTT seconds 20.5*     HbA1C   Lab Results   Component Value Date    HGBA1C 8.3 (H) 07/25/2019    HGBA1C 10.5 (H) 09/24/2018     Blood Glucose   Glucose   Date/Time Value Ref Range Status   01/07/2020 0702 96 70 - 105 mg/dL Final     Comment:     Serial Number: [id number]:  [id number]   01/06/2020 2012 312 (H) 70 - 105 mg/dL Final     Comment:     Serial Number: [id number]:  [id number]   01/06/2020 1658 222 (H) 70 - 105 mg/dL Final     Comment:     Serial Number: [id number]:  [id number]   01/06/2020 1133 263 (H) 70 - 105 mg/dL Final     Comment:     Serial Number: [id number]:  [id number]   01/06/2020 0750 305 (H) 70 - 105 mg/dL Final     Comment:     Serial Number: [id number]:  [id number]   01/05/2020 2148  388 (H) 70 - 105 mg/dL Final     Comment:     Serial Number: [id number]:  [id number]     Infection Results from last 7 days   Lab Units 01/06/20  1011 01/05/20  1436 01/05/20  1430 01/05/20  1421   BLOODCX  Abnormal Stain*  --  Abnormal Stain* Enterococcus species*   URINECX   --  >100,000 CFU/mL Escherichia coli*  --   --    BCIDPCR  Enterococcus spp. Identification by BCID PCR.*  --   --  Enterococcus spp. Identification by BCID PCR.*     CMP Results from last 7 days   Lab Units 01/07/20  0603 01/06/20  0613 01/05/20  1227   SODIUM mmol/L 134* 130* 134*   POTASSIUM mmol/L 4.0 5.2 4.4   CHLORIDE mmol/L 98 95* 99   CO2 mmol/L 27.0 21.0* 20.0*   BUN mg/dL 27* 37* 20   CREATININE mg/dL 0.60* 0.73* 0.67*   GLUCOSE mg/dL 114* 380* 377*   ALBUMIN g/dL  --   --  3.30*   BILIRUBIN mg/dL  --   --  0.2   ALK PHOS U/L  --   --  190*   AST (SGOT) U/L  --   --  56*   ALT (SGPT) U/L  --   --  48*     ABG      UA  Results from last 7 days   Lab Units 01/05/20  1436   NITRITE UA  Positive*   WBC UA /HPF Too Numerous to Count*   BACTERIA UA /HPF 1+*   SQUAM EPITHEL UA /HPF 0-2   URINECX  >100,000 CFU/mL Escherichia coli*     NIRMALA  No results found for: POCMETH, POCAMPHET, POCBARBITUR, POCBENZO, POCCOCAINE, POCOPIATES, POCOXYCODO, POCPHENCYC, POCPROPOXY, POCTHC, POCTRICYC  Lysis Labs Results from last 7 days   Lab Units 01/07/20  0603 01/06/20  0613 01/06/20  0606 01/05/20  1227   INR   --   --   --  1.03   APTT seconds  --   --   --  20.5*   HEMOGLOBIN g/dL 9.6*  --  9.7* 10.9*   PLATELETS 10*3/mm3 395  --  468* 487*   CREATININE mg/dL 0.60* 0.73*  --  0.67*     Radiology(recent) Ct Abdomen Pelvis With & Without Contrast    Result Date: 1/6/2020   1.  Left-sided ureteral stent appropriately positioned.  No evidence of hydronephrosis.  No definite renal or ureteral stone identified. 2.  Status post distal aorto bifemoral bypass graft placement.  The graft appears to be completely occluded just below the level of the renal  arteries.  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  Portion of the left common femoral artery appears to be reconstituted by multiple small collaterals.  The right common femoral artery does not appear to be reconstituted. 3.  Moderate-sized left ventricular aneurysm 4.  Severe muscular atrophy of the pelvis and upper thighs 5.  Bilateral pleural effusions and bilateral lower lobe airspace disease 6.  Small pericardial effusion.  Electronically Signed By-Ted Roberts On:1/6/2020 11:18 PM This report was finalized on [id number] by  Ted Roberts, .    Xr Chest 1 View    Result Date: 1/5/2020   1. Low lung volumes. 2. No active pulmonary disease.  Electronically Signed By-John Chavira On:1/5/2020 1:10 PM This report was finalized on [id number] by  John Chavira, .        Results from last 7 days   Lab Units 01/07/20  0603  01/06/20  0613   CK TOTAL U/L 79   < > 94   TROPONIN T ng/mL  --   --  0.463*    < > = values in this interval not displayed.       Xrays, labs reviewed personally by physician.    ECG/EMG Results (most recent)     Procedure Component Value Units Date/Time    Transthoracic Echo Complete With Contrast if Necessary Per Protocol [[id number]] Collected:  01/06/20 1255     Updated:  01/06/20 1535     BSA 1.9 m^2       CV ECHO ANGELIA - RVDD 2.7 cm      IVSd 1.0 cm      LVIDd 5.9 cm      LVIDs 5.6 cm      LVPWd 1.4 cm      IVS/LVPW 0.75     FS 4.7 %      EDV(Teich) 174.6 ml      ESV(Teich) 156.3 ml      EF(Teich) 10.5 %      EDV(cubed) 207.6 ml      ESV(cubed) 179.6 ml      EF(cubed) 13.5 %      LV mass(C)d 306.7 grams      LV mass(C)dI 161.7 grams/m^2      SV(Teich) 18.3 ml      SI(Teich) 9.6 ml/m^2      SV(cubed) 28.0 ml      SI(cubed) 14.7 ml/m^2      Ao root diam 3.4 cm      Ao root area 9.2 cm^2      asc Aorta Diam 3.0 cm      LVOT diam 2.2 cm      LVOT area 3.8 cm^2      RVOT diam 2.6 cm      RVOT area 5.3 cm^2      EDV(MOD-sp4) 125.9 ml       ESV(MOD-sp4) 86.6 ml      EF(MOD-sp4) 31.2 %      SV(MOD-sp4) 39.3 ml      SI(MOD-sp4) 20.7 ml/m^2      Ao root area (BSA corrected) 1.8     LV Humphries Vol (BSA corrected) 66.3 ml/m^2      LV Sys Vol (BSA corrected) 45.6 ml/m^2      Aortic R-R 0.5 sec      Aortic .4 BPM      MV E max miguelito 48.2 cm/sec      MV A max miguelito 99.7 cm/sec      MV E/A 0.48     MV V2 max 116.2 cm/sec      MV max PG 5.4 mmHg      MV V2 mean 74.0 cm/sec      MV mean PG 2.5 mmHg      MV V2 VTI 16.5 cm      MVA(VTI) 3.1 cm^2      MV dec slope 1,140 cm/sec^2      MV dec time 0.04 sec      Ao pk miguelito 184.8 cm/sec      Ao max PG 13.7 mmHg      Ao max PG (full) 10.3 mmHg      Ao V2 mean 112.7 cm/sec      Ao mean PG 6.3 mmHg      Ao mean PG (full) 5.0 mmHg      Ao V2 VTI 22.3 cm      SUSY(I,A) 2.3 cm^2      SUSY(I,D) 2.3 cm^2      SUSY(V,A) 1.9 cm^2      SUSY(V,D) 1.9 cm^2      LV V1 max PG 3.4 mmHg      LV V1 mean PG 1.3 mmHg      LV V1 max 91.7 cm/sec      LV V1 mean 49.7 cm/sec      LV V1 VTI 13.6 cm      MR max miguelito 456.8 cm/sec      MR max PG 83.5 mmHg      CO(Ao) 24.6 l/min      CI(Ao) 13.0 l/min/m^2      SV(Ao) 206.0 ml      SI(Ao) 108.6 ml/m^2      CO(LVOT) 6.1 l/min      CI(LVOT) 3.2 l/min/m^2      SV(LVOT) 51.5 ml      SV(RVOT) 48.4 ml      SI(LVOT) 27.1 ml/m^2      PA V2 max 77.5 cm/sec      PA max PG 2.4 mmHg      PA max PG (full) 0.42 mmHg      PA V2 mean 50.0 cm/sec      PA mean PG 1.2 mmHg      PA mean PG (full) 0.35 mmHg      PA V2 VTI 11.6 cm      PVA(I,A) 4.2 cm^2       CV ECHO ANGELIA - PVA(I,D) 4.2 cm^2       CV ECHO ANGELIA - PVA(V,A) 4.8 cm^2       CV ECHO ANGELIA - PVA(V,D) 4.8 cm^2      PA acc time 0.08 sec      RV V1 max PG 2.0 mmHg      RV V1 mean PG 0.86 mmHg      RV V1 max 70.4 cm/sec      RV V1 mean 40.6 cm/sec      RV V1 VTI 9.1 cm      TR max miguelito 167.4 cm/sec      RVSP(TR) 14.2 mmHg      RAP systole 3.0 mmHg      PA pr(Accel) 44.7 mmHg      Qp/Qs 0.94      CV ECHO ANGELIA - BZI_BMI 21.8 kilograms/m^2       CV ECHO ANGELIA -  BSA(HAYCOCK) 1.9 m^2       CV ECHO ANGELIA - BZI_METRIC_WEIGHT 70.8 kg       CV ECHO ANGELIA - BZI_METRIC_HEIGHT 180.3 cm      EF(MOD-bp) 31.0 %      LA dimension(2D) 4.7 cm      Echo EF Estimated 35 %     Narrative:       · The left ventricular cavity is mild-to-moderately dilated.  · Left ventricular wall thickness is consistent with mild concentric   hypertrophy.  · The following left ventricular wall segments are hypokinetic: mid   anterior, apical anterior, apical septal, mid inferoseptal, apex   hypokinetic, mid anteroseptal and basal anterior. The following left   ventricular wall segments are akinetic: basal anterolateral, mid   anterolateral, apical lateral, basal inferolateral, mid inferolateral,   apical inferior, basal inferior, mid inferior and basal inferoseptal.  · There is a small (<1cm) pericardial effusion.  · Estimated EF = 35%.  · Left ventricular diastolic dysfunction (grade I) consistent with   impaired relaxation.  · Left atrial cavity size is mild-to-moderately dilated.  · Mild mitral valve regurgitation is present  · Mild tricuspid valve regurgitation is present.       ECG 12 Lead [[id number]] Collected:  01/05/20 1150     Updated:  01/06/20 1622    Narrative:       HEART RATE= 118  bpm  RR Interval= 508  ms  IA Interval= 150  ms  P Horizontal Axis= -10  deg  P Front Axis= 64  deg  QRSD Interval= 109  ms  QT Interval= 350  ms  QRS Axis= 22  deg  T Wave Axis= -70  deg  - ABNORMAL ECG -  Sinus tachycardia  Incomplete left bundle branch block  Inferior infarct, age indeterminate  No previous ECG available for comparison  Electronically Signed By: Mickey Colon ([facility]) 06-Jan-2020 16:22:08  Date and Time of Study: 2020-01-05 11:50:25            Medication Review:   I have reviewed the patient's current medication list  Scheduled Meds:  aspirin 325 mg Oral Daily   atorvastatin 40 mg Oral Daily   DAPTOmycin 6 mg/kg Intravenous Q24H   gabapentin 100 mg Oral BID   hydrocortisone 1 application Topical Q12H    insulin glargine 10 Units Subcutaneous Nightly   insulin lispro 0-9 Units Subcutaneous 4x Daily With Meals & Nightly   metoprolol tartrate 50 mg Oral TID   pantoprazole 40 mg Oral QAM   piperacillin-tazobactam 3.375 g Intravenous Q8H   sodium chloride 10 mL Intravenous Q12H   tamsulosin 0.4 mg Oral Nightly     Continuous Infusions:   PRN Meds:.dextrose  •  dextrose  •  docusate sodium  •  glucagon (human recombinant)  •  HYDROcodone-acetaminophen  •  insulin lispro **AND** insulin lispro  •  ipratropium-albuterol  •  ketamine (KETALAR) infusion **AND** Ketamine Vital Signs & Assessment  •  nitroglycerin  •  ondansetron **OR** ondansetron  •  [COMPLETED] Insert peripheral IV **AND** sodium chloride  •  sodium chloride    Imaging:  Imaging Results (Last 72 Hours)     Procedure Component Value Units Date/Time    CT Abdomen Pelvis With & Without Contrast [[id number]] Collected:  01/06/20 2303     Updated:  01/06/20 2320    Narrative:          DATE OF EXAM:  1/6/2020 10:43 PM     PROCEDURE:  CT ABDOMEN PELVIS W WO CONTRAST-     INDICATIONS:  UTI; L ureteral stricture s/p stent; R07.9-Chest pain, unspecified;  A41.9-Sepsis, unspecified organism; D72.829-Elevated white blood cell  count, unspecified     COMPARISON:   No Comparisons Available     TECHNIQUE:   Multiple axial images were obtained through the abdomen and pelvis  without the administration of contrast.  Additional scanning through the  abdomen and pelvis followed by the  intravenous administration of 100 mL  of Isovue 370. Reconstructed coronal and sagittal images were also  obtained. Automated exposure control and iterative construction methods  were used.     FINDINGS:  There are small bilateral pleural effusions, right greater than left.   There is bilateral lower lobe airspace disease, right greater than left  likely due to atelectasis.  Superimposed pneumonia cannot be entirely  excluded.  There is extensive coronary artery calcification.  There is  also  aortic valvular calcification.  There is small pericardial  effusion.  There is an irregular shaped left ventricular aneurysm  arising from the posterior inferior surface of the left ventricle.   There are extensive renal vascular calcifications.  No definite renal or  ureteral stone identified.  There is a left-sided ureteral stent which  appears to be appropriately positioned within the left renal pelvis with  the distal portion of the stent within the urinary bladder.  There is no  hydronephrosis.  There is a 1.4 cm low-density mass of the upper pole  the right kidney compatible with a cyst.  Multiple smaller low density  masses are noted of both kidneys also compatible with cyst.  No  right-sided hydronephrosis is seen.  Bilateral adrenal glands are within  normal limits.  Liver and pancreas are within normal limits.  There is a  well-circumscribed low-density 1 7 m mass centrally within the spleen  likely a cyst or possibly a hemangioma.  There are splenic granulomas.   Gallbladder is at the upper limits of normal in size.  No inflammatory  change seen around the gallbladder.  Upper GI tract appears within  normal limits.  There is atherosclerotic vascular calcification of the  aorta and abdominal branch vessels.  There is approximately 50% stenosis  of the celiac artery.  Patient is status post aorto bifemoral bypass  graft placement.  There appears to be complete occlusion of the native  aorta and graft just below the renal arteries.  The low left common  femoral artery appears to be reconstituted by small peripheral  collaterals.  Right common femoral artery does not appear to be  reconstituted during this phase of imaging.  There appears to be a  pseudoaneurysm at the left common femoral anastomosis.  Pseudoaneurysm  measures proximal a 2.4 cm in size.     Pelvis: Cedeno catheter is in place within the urinary bladder which is  decompressed.  There is a small amount of free fluid in the pelvis of  uncertain  etiology.  GI tract appears within normal limits.  The  appendix appears normal.  No pelvic or inguinal adenopathy identified.   There is marked muscular atrophy of the gluteal and thigh muscles.   There are postoperative changes of the left femur related to prior  fracture repair.  There are old healed fractures of the superior and  inferior pubic rami bilaterally.  There are severe degenerative changes  of the hips and lumbar spine.  Patient is status post fusion from the  L1-L5 levels.  There are also posterior fusion rods and laminar hooks  within the lower thoracic spine.  No lytic or sclerotic bony lesion  identified.       Impression:          1.  Left-sided ureteral stent appropriately positioned.  No evidence of  hydronephrosis.  No definite renal or ureteral stone identified.  2.  Status post distal aorto bifemoral bypass graft placement.  The  graft appears to be completely occluded just below the level of the  renal arteries.  There is a pseudoaneurysm measuring 2.4 cm in size of  the left common femoral anastomosis.  Portion of the left common femoral  artery appears to be reconstituted by multiple small collaterals.  The  right common femoral artery does not appear to be reconstituted.  3.  Moderate-sized left ventricular aneurysm  4.  Severe muscular atrophy of the pelvis and upper thighs  5.  Bilateral pleural effusions and bilateral lower lobe airspace  disease  6.  Small pericardial effusion.     Electronically Signed By-Ted Roberts On:1/6/2020 11:18 PM  This report was finalized on [id number] by  Ted Roberts, .    XR Chest 1 View [[id number]] Collected:  01/05/20 1309     Updated:  01/05/20 1312    Narrative:       DATE OF EXAM:  1/5/2020 12:59 PM     PROCEDURE:  XR CHEST 1 VW-     INDICATIONS:  Chest pain, past tobacco abuse.      COMPARISON:  No Comparisons Available     TECHNIQUE:   Single radiographic view of the chest was obtained.     FINDINGS:  The heart size is normal. The lung  volumes are diminished. The pulmonary  vascular markings are felt to be normal. The lungs and pleural spaces  are clear of active disease. There is a left upper extremity PICC line  in place with the tip terminating in superior vena cava.  There are  chronic age-related changes involving the bony thorax and thoracic  aorta. There is fusion hardware seen within the visualized lower  thoracic spine.       Impression:          1. Low lung volumes.  2. No active pulmonary disease.     Electronically Signed By-John Chavira On:1/5/2020 1:10 PM  This report was finalized on [id number] by  John Chavira, .            Amalia Guardado, VASQUEZ  01/07/20  8:12 AM

## 2020-01-07 NOTE — DISCHARGE PLACEMENT REQUEST
"Milton Solano (74 y.o. Male)     Date of Birth Social Security Number Address Home Phone MRN    1945  [address] [facility] [address]  [id number]    [employer] Marital Status          Yazdanism        Admission Date Admission Type Admitting Provider Attending Provider Department, Room/Bed    20 Emergency Radha Swanson MD Nallapuram, Divya, MD [facility] PROGRESS CARE,     Discharge Date Discharge Disposition Discharge Destination                       Attending Provider:  Radha Swanson MD    Allergies:  Keflex [Cephalexin], Lisinopril    Isolation:  None   Infection:  None   Code Status:  CPR    Ht:  180.3 cm (71\")   Wt:  71.1 kg (156 lb 12 oz)    Admission Cmt:  None   Principal Problem:  None                Active Insurance as of 2020     Primary Coverage     Payor Plan Insurance Group Employer/Plan Group    MEDICARE MEDICARE A & B      Payor Plan Address Payor Plan Phone Number Payor Plan Fax Number Effective Dates    [address] [phone]  1978 - None Entered    [facility] [id number]       Subscriber Name Subscriber Birth Date Member ID       MILTON SOLANO 1945 [id number]           Secondary Coverage     Payor Plan Insurance Group Employer/Plan Group    HUMANA HUMANA [facility]              [id number]     Payor Plan Address Payor Plan Phone Number Payor Plan Fax Number Effective Dates    [address] [address]   2017 - None Entered    [facility] [id number]       Subscriber Name Subscriber Birth Date Member ID       MILTON SOLANO 1945 [id number]                 Emergency Contacts      (Rel.) Home Phone Work Phone Mobile Phone    ERIC SOLANO (Spouse) -- -- [phone]               History & Physical      Jarocho Pickard MD at 20 193                [facility]      Patient Name: Milton Solano  : 1945  MRN: [id number]  Primary Care Physician: Jimy Lopez MD  Date of " admission: 1/5/2020    Patient Care Team:  Jimy Lopez MD as PCP - General (Family Medicine)    Subjective   History Present Illness     Chief Complaint:   Chief Complaint   Patient presents with   • Chest Pain     Mr. Solano is a 74 y.o. white male, nursing home resident, with multiple medical problems, a history of coronary artery disease, PVD S/P R AKA,  Presented with chest pain .  Patient states the pain started this morning and has been constant, moderate.  The pain is in the center of his chest and radiates to his neck. Worse with deep inspiration, He has had associated shortness of breath and diaphoresis.  He denies any alleviating or exacerbating factors.  Pain is described as moderate in intensity. Found to have a temp 99.3,     Social   etoh quit 30 yrs ago, quit smoking 2012    Family hx non contributory    Review of Systems   Constitution: Positive for fever.   HENT: Negative.    Eyes: Negative.    Cardiovascular: Positive for chest pain.   Respiratory: Positive for shortness of breath.    Endocrine: Negative.    Hematologic/Lymphatic: Negative.    Skin: Negative.    Musculoskeletal: Positive for joint pain.   Gastrointestinal: Positive for constipation.   Genitourinary: Positive for dysuria.   Neurological: Negative.    Psychiatric/Behavioral: Negative.    Allergic/Immunologic: Negative.    All other systems reviewed and are negative.      Personal History     Past Medical History:   Past Medical History:   Diagnosis Date   • Angina at rest (CMS/[facility])    • Benign prostatic disease    • Candidiasis    • Chest pain 01/05/2020   • Constipation    • Coronary artery disease    • Cutaneous abscess     right lower limb   • CVA (cerebral vascular accident) (CMS/HCC)     years ago TIA per wife   • Diabetes mellitus (CMS/[facility])     Type 2   • Essential hypertension    • GERD (gastroesophageal reflux disease)    • Hyperlipemia    • Hypoglycemia    • Iron deficiency anemia    • Peripheral vascular  disease (CMS/HCC)    • Urethral stricture    • UTI (urinary tract infection)      Surgical History:      Past Surgical History:   Procedure Laterality Date   • ABDOMINAL AORTIC ANEURYSM REPAIR     • ABOVE KNEE LEG AMPUTATION Right    • BACK SURGERY  1975, 2008   • CARDIAC CATHETERIZATION  2005    with stent placement   • CAROTID ENDARTERECTOMY Left    • FEMORAL ENDARTERECTOMY Bilateral    • HIP PINNING Left      Family History: family history is not on file. Otherwise pertinent FHx was reviewed and unremarkable.     Social History:  reports that he has quit smoking. He does not have any smokeless tobacco history on file. He reports that he does not use drugs.    Medications:  Prior to Admission medications    Medication Sig Start Date End Date Taking? Authorizing Provider   anidulafungin (ERAXIS) 100 MG injection Infuse 100 mg into a venous catheter Every Night. 34 total days; last day Wednesday, 1/8/20 1/8/20 Yes Terri Machado MD   aspirin 325 MG tablet Take 325 mg by mouth Daily.   Yes Terri Machado MD   atorvastatin (LIPITOR) 40 MG tablet Take 40 mg by mouth Daily.   Yes Terri Machado MD   Cranberry-Vitamin C-Probiotic (AZO CRANBERRY) 250-30 MG tablet Take 1 tablet by mouth Daily As Needed (bladder spasms).   Yes Terri Machado MD   famotidine (PEPCID) 20 MG tablet Take 20 mg by mouth 2 (Two) Times a Day As Needed.   Yes Terri Machado MD   gabapentin (NEURONTIN) 100 MG capsule Take 100 mg by mouth 2 (Two) Times a Day. 12/4/19  Yes Terri Machado MD   glipizide (GLUCOTROL) 10 MG tablet Take 10 mg by mouth 2 (Two) Times a Day Before Meals.   Yes Terri Mcahado MD   hydrocortisone 1 % ointment Apply 1 application topically to the appropriate area as directed 2 (Two) Times a Day As Needed.   Yes Terri Machado MD   insulin detemir (LEVEMIR) 100 UNIT/ML injection Inject 20 Units under the skin into the appropriate area as directed Every Morning.   Yes  Terri Machado MD   ipratropium-albuterol (DUO-NEB) 0.5-2.5 mg/3 ml nebulizer Take 3 mL by nebulization 3 (Three) Times a Day As Needed for Wheezing.   Yes Terri Machado MD   metFORMIN (GLUCOPHAGE) 1000 MG tablet Take 1,000 mg by mouth 2 (Two) Times a Day With Meals.   Yes Terri Machado MD   metoprolol tartrate (LOPRESSOR) 50 MG tablet Take 50 mg by mouth 2 (Two) Times a Day.   Yes Terri Machado MD   nitroglycerin (NITROSTAT) 0.4 MG SL tablet Place 0.4 mg under the tongue Every 5 (Five) Minutes As Needed for Chest Pain. Take no more than 3 doses in 15 minutes.   Yes Terri Machado MD   SITagliptin (JANUVIA) 100 MG tablet Take 100 mg by mouth Every Night.   Yes Terri Machado MD   tamsulosin (FLOMAX) 0.4 MG capsule 24 hr capsule Take 0.4 mg by mouth Daily.   Yes Terri Machado MD   zinc oxide 20 % ointment Apply 1 application topically to the appropriate area as directed 2 (Two) Times a Day As Needed.  1/5/20  Terri Machado MD       Allergies:    Allergies   Allergen Reactions   • Keflex [Cephalexin] Unknown - High Severity     Unknown     • Lisinopril Unknown - High Severity     unknown       Objective   Objective     Vital Signs  Temp:  [97.8 °F (36.6 °C)-99.3 °F (37.4 °C)] 97.8 °F (36.6 °C)  Heart Rate:  [110-120] 110  Resp:  [16-36] 16  BP: ()/(58-87) 103/67  SpO2:  [97 %-100 %] 100 %  on   ;   Device (Oxygen Therapy): room air  Body mass index is 21.62 kg/m².    Physical Exam   Constitutional: He is oriented to person, place, and time. He appears well-developed and well-nourished. No distress.   HENT:   Head: Normocephalic and atraumatic.   Right Ear: External ear normal.   Left Ear: External ear normal.   Nose: Nose normal.   Mouth/Throat: Oropharynx is clear and moist. No oropharyngeal exudate.   Eyes: Pupils are equal, round, and reactive to light. Conjunctivae and EOM are normal. Right eye exhibits no discharge. Left eye exhibits no discharge.  No scleral icterus.   Neck: Normal range of motion. No JVD present. No tracheal deviation present. No thyromegaly present.   Cardiovascular: Normal rate, regular rhythm, normal heart sounds and intact distal pulses. Exam reveals no gallop and no friction rub.   No murmur heard.  Pulmonary/Chest: Effort normal and breath sounds normal. No stridor. No respiratory distress. He has no wheezes. He has no rales. He exhibits no tenderness.   Abdominal: Soft. Bowel sounds are normal. He exhibits no distension and no mass. There is no tenderness. There is no rebound and no guarding. No hernia.   Genitourinary:   Genitourinary Comments: Cedeno cath   Musculoskeletal: Normal range of motion. He exhibits no edema, tenderness or deformity.   R-AKA  degenerative cahnges   Lymphadenopathy:     He has no cervical adenopathy.   Neurological: He is alert and oriented to person, place, and time. No cranial nerve deficit or sensory deficit. He exhibits normal muscle tone. Coordination normal.   Skin: Skin is warm and dry. No rash noted. He is not diaphoretic. No erythema.   Psychiatric: He has a normal mood and affect. His behavior is normal.   Nursing note and vitals reviewed.      Results Review:  I have personally reviewed most recent lab results and agree with findings, most notably.    Results from last 7 days   Lab Units 01/05/20  1227   WBC 10*3/mm3 21.10*   HEMOGLOBIN g/dL 10.9*   HEMATOCRIT % 34.8*   PLATELETS 10*3/mm3 487*   INR  1.03     Results from last 7 days   Lab Units 01/05/20  1742  01/05/20  1227   SODIUM mmol/L  --   --  134*   POTASSIUM mmol/L  --   --  4.4   CHLORIDE mmol/L  --   --  99   CO2 mmol/L  --   --  20.0*   BUN mg/dL  --   --  20   CREATININE mg/dL  --   --  0.67*   GLUCOSE mg/dL  --   --  377*   CALCIUM mg/dL  --   --  9.7   ALT (SGPT) U/L  --   --  48*   AST (SGOT) U/L  --   --  56*   TROPONIN T ng/mL  --   --  0.039*   PROBNP pg/mL  --   --  1,272.0   LACTATE mmol/L 3.3*   < >  --     < > = values in  this interval not displayed.     Estimated Creatinine Clearance: 79.3 mL/min (A) (by C-G formula based on SCr of 0.67 mg/dL (L)).  Brief Urine Lab Results  (Last result in the past 365 days)      Color   Clarity   Blood   Leuk Est   Nitrite   Protein   CREAT   Urine HCG        01/05/20 1436 Dark Yellow  Comment:  Result checked  Turbid  Comment:  Result checked  Large (3+) Large (3+) Positive >=300 mg/dL (3+)               Microbiology Results (last 10 days)     Procedure Component Value - Date/Time    Influenza Antigen, Rapid - Swab, Nasopharynx [[id number]]  (Normal) Collected:  01/05/20 1422    Lab Status:  Final result Specimen:  Swab from Nasopharynx Updated:  01/05/20 1445     Influenza A PCR Not Detected     Influenza B PCR Not Detected          ECG/EMG Results (most recent)     Procedure Component Value Units Date/Time    ECG 12 Lead [[id number]] Collected:  01/05/20 1150     Updated:  01/05/20 1152    Narrative:       HEART RATE= 118  bpm  RR Interval= 508  ms  IA Interval= 150  ms  P Horizontal Axis= -10  deg  P Front Axis= 64  deg  QRSD Interval= 109  ms  QT Interval= 350  ms  QRS Axis= 22  deg  T Wave Axis= -70  deg  - ABNORMAL ECG -  Sinus tachycardia  Incomplete left bundle branch block  Inferior infarct, age indeterminate  Electronically Signed By:   Date and Time of Study: 2020-01-05 11:50:25                    Xr Chest 1 View    Result Date: 1/5/2020   1. Low lung volumes. 2. No active pulmonary disease.  Electronically Signed By-John Chavira On:1/5/2020 1:10 PM This report was finalized on [id number] by  John Chavira, .        Estimated Creatinine Clearance: 79.3 mL/min (A) (by C-G formula based on SCr of 0.67 mg/dL (L)).    Assessment/Plan   Assessment/Plan       Active Hospital Problems    Diagnosis  POA   • Chest pain [R07.9]  Yes   • Diabetes mellitus (CMS/HCC) [E11.9]  Yes   • Candidiasis [B37.9]  Yes   • Hyperlipemia [E78.5]  Yes   • Essential hypertension [I10]  Yes   • Peripheral vascular  disease (CMS/HCC) [I73.9]  Yes   • GERD (gastroesophageal reflux disease) [K21.9]  Yes   • Coronary artery disease [I25.10]  Yes   • Benign prostatic disease [N42.9]  Yes      Resolved Hospital Problems   No resolved problems to display.       Active Hospital Problems:    Sepsis present on admission-lilkely secondary to UTI, indueling martinez cath  WBC 21.   -CXR:no active disease   -IV fluid bolus given in ER  -lactic 3.1-3.3  -continue iv abx,  -vancomycin/cefepime  -Blood cultures.  -has a picc dec/6/2019>on eraxis  -monitor CBC    Chest pain/Hx of CAD  -Myoview  -serial enzymes  -echo    DM2  -continue long actin insulin  -RISS  -hold po meds    HTN-bp 103/67  -hold metoprolol, losartan    HLD  -continue lipitor  -check lipid p    Candidemia  -continue eraxis      VTE Prophylaxis - SCDs.    CODE STATUS:    Code Status and Medical Interventions:   Ordered at: 01/1945     Level Of Support Discussed With:    Patient     Code Status:    CPR     Medical Interventions (Level of Support Prior to Arrest):    Full       Admission Status:  I believe this patient meets obs  criteria.      I discussed the patients findings and my recommendations with patient.        Electronically signed by Jarocho Pickard MD, 01/05/20, 7:37 PM.  [facility] Hospitalist Team          Electronically signed by Jarocho Pickard MD at 01/05/20 2015       Current Facility-Administered Medications   Medication Dose Route Frequency Provider Last Rate Last Dose   • aspirin tablet 325 mg  325 mg Oral Daily Jarocho Pickard MD   325 mg at 01/07/20 0841   • atorvastatin (LIPITOR) tablet 40 mg  40 mg Oral Daily Jarocho Pickard MD   40 mg at 01/07/20 0841   • DAPTOmycin (CUBICIN) 450 mg in sodium chloride 0.9 % 50 mL IVPB  6 mg/kg Intravenous Q24H Jack Ling  mL/hr at 01/07/20 1215 450 mg at 01/07/20 1215   • dextrose (D50W) 25 g/ 50mL Intravenous Solution 25 g  25 g Intravenous Q15 Min PRN Jarocho Pickard MD          • dextrose (GLUTOSE) oral gel 15 g  15 g Oral Q15 Min PRN Jarocho Pickard MD       • docusate sodium (COLACE) capsule 100 mg  100 mg Oral BID PRN Jarocho Pickard MD       • gabapentin (NEURONTIN) capsule 100 mg  100 mg Oral BID Jarocho Pickard MD   100 mg at 01/07/20 0843   • glucagon (human recombinant) (GLUCAGEN DIAGNOSTIC) injection 1 mg  1 mg Subcutaneous Q15 Min PRN Jarocho Pickard MD       • HYDROcodone-acetaminophen (NORCO) 5-325 MG per tablet 1 tablet  1 tablet Oral Q6H PRN Jarocho Pickard MD   1 tablet at 01/05/20 2157   • hydrocortisone 1 % ointment 1 application  1 application Topical Q12H Jarocho Pickard MD   1 application at 01/07/20 0842   • insulin glargine (LANTUS) injection 10 Units  10 Units Subcutaneous Nightly Jarocho Pickard MD   10 Units at 01/06/20 2115   • insulin lispro (humaLOG) injection 0-9 Units  0-9 Units Subcutaneous 4x Daily With Meals & Nightly Jarocho Pickard MD   2 Units at 01/07/20 1214    And   • insulin lispro (humaLOG) injection 0-9 Units  0-9 Units Subcutaneous PRN Jarocho Pickard MD       • ipratropium-albuterol (DUO-NEB) nebulizer solution 3 mL  3 mL Nebulization TID PRN Jarocho Pickard MD       • ketamine (KETALAR) 21 mg in sodium chloride 0.9 % 100 mL infusion  0.3 mg/kg Intravenous Daily PRN Jarocho Pickard MD       • metoprolol tartrate (LOPRESSOR) tablet 50 mg  50 mg Oral TID Falguni Ng MD   50 mg at 01/07/20 1615   • nitroglycerin (NITROSTAT) SL tablet 0.4 mg  0.4 mg Sublingual Q5 Min PRN Jarocho Pickard MD       • ondansetron (ZOFRAN) tablet 4 mg  4 mg Oral Q6H PRN Jarocho Pickard MD        Or   • ondansetron (ZOFRAN) injection 4 mg  4 mg Intravenous Q6H PRN Jarocho Pickard MD       • pantoprazole (PROTONIX) EC tablet 40 mg  40 mg Oral QAM Jarocho Pickard MD   40 mg at 01/07/20 0841   • piperacillin-tazobactam (ZOSYN) IVPB 3.375 g in 100 mL NS (CD)  3.375 g Intravenous Q8H  Kindra Hopkins, APRN   3.375 g at 01/07/20 1615   • sodium chloride 0.9 % flush 10 mL  10 mL Intravenous PRN Jarocho Pickard MD       • sodium chloride 0.9 % flush 10 mL  10 mL Intravenous Q12H Jarocho Pickard MD   10 mL at 01/07/20 0845   • sodium chloride 0.9 % flush 10 mL  10 mL Intravenous PRN Jarocho Pickard MD       • tamsulosin (FLOMAX) 24 hr capsule 0.4 mg  0.4 mg Oral Nightly Jarocho Pickard MD   0.4 mg at 01/06/20 2114        Physician Progress Notes (last 24 hours) (Notes from 01/06/20 1715 through 01/07/20 1715)      Lio Mata MD at 01/07/20 1529        Patient's wife declines to be seen by vascular surgery at this point.  However, I have reviewed the chart extensively.  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  There is still a prosthetic graft in the abdomen that is no longer functional.  If the concern is for an ongoing prosthetic graft infection, would recommend transfer to the facility that did his operations less than a month ag o.   Electronically signed by Lio Mata MD at 01/07/20 1529     Falguni Ng MD at 01/07/20 0811                    Cardiology Progress  Note      Patient Care Team:  Jimy Lopez MD as PCP - General (Family Medicine)     Cardiology assessment and plan    Chest pain atypical  Coronary artery disease  Ischemic cardiomyopathy  Echocardiogram with estimated LV ejection fraction of 35%  EKG with sinus tachycardia incomplete left bundle branch block and ST-T wave changes in the inferolateral leads  Mild nonspecific elevation of troponin  Sepsis  Hypertension  Peripheral vascular disease  Hyperlipidemia  History of prostate cancer  Frail status with multiple comorbidities  Enterococcal bacteremia  Urinary tract infection with E. Coli  Multiple peripheral vascular surgeries  Recent cardiac catheterization at [place] with significant disease involving the PLB  branch of the right coronary artery because of the small size of the vessel currently being medically managed     Test results and treatment options discussed with the patient and family  Cath report from [facility] that was done in  patient had 100% occlusion of the left circumflex artery which was a  there was a significant disease in the PLV branch which was felt to be small in size and was left for medical therapy  Discussed with the family and patient at bedside plan to manage conservatively at this time  Continue aspirin statin beta-blocker  Recommend DVT prophylaxis if there is no contraindication  Further recommendations based on patient hospital course              PATIENT IDENTIFICATION    Name: Artur Solano Age: 74 y.o. Sex: male :  1945 MRN: [id number]    REASON FOR FOLLOW-UP:  Atypical chest pain  Ischemic cardiomyopathy with echocardiogram findings of EF 35%  Mild, nonspecific troponin elevation        SUBJECTIVE    Patient denies any further chest discomfort  Denies any shortness of breath  No pain reported      REVIEW OF SYSTEMS:  Pertinent items are noted in HPI, all other systems reviewed and negative  Patient denies any headache no nausea vomiting diarrhea no bruises no melena no abdominal pain no dizziness no syncope  OBJECTIVE   Lying in bed and appears comfortable    ASSESSMENT/PLAN    Chest pain    Diabetes mellitus (CMS/HCC)    Candidiasis    Hyperlipemia    Essential hypertension    Peripheral vascular disease (CMS/HCC)    GERD (gastroesophageal reflux disease)    Coronary artery disease    Benign prostatic disease    Chest pain atypical  Coronary artery disease  Ischemic cardiomyopathy  Echocardiogram with estimated LV ejection fraction of 35%  EKG with sinus tachycardia incomplete left bundle branch block and ST-T wave changes in the inferolateral leads  Mild nonspecific elevation of troponin  Sepsis  Hypertension  Peripheral vascular  disease  Hyperlipidemia  History of prostate cancer  Frail status with multiple comorbidities  Enterococcal bacteremia  Urinary tract infection with E. Coli  Multiple peripheral vascular surgeries  Recent cardiac catheterization at [place] with significant disease involving the PLB branch of the right coronary artery because of the small size of the vessel currently being medically managed    Plan:  Test results and treatment options discussed with the patient and family  Medical records from recent hospitalization at [facility] reviewed by cardiologist.  No PCI performed during heart cath.  Plan to manage patient conservatively from cardiac standpoint at this time  Rhythm sinus  Further recommendations based on patient hospital course      VASQUEZ Narayan  20  8:12 AM  Electronically signed by Falguni Ng MD at 20 1212     Matti Miller MD at 20 0700          Urology Progress Note    Patient Identification:  Name:  Artur Solano  Age:  74 y.o.  Sex:  male  :  1945  MRN:  [id number]    Chief Complaint: Patient without complaint    History of Present Illness: CT scan shows few small bilateral renal cysts.  No evidence for renal abscess or pyelonephritis.  No evidence for obstruction with a left stent in place.  Urine culture reveals E. coli which is sensitive to the cephalosporins and Zosyn as well as Macrobid.  Blood cultures have been growing enterococcus.  Antibiotics per infectious disease.    Problem List:  Patient Active Problem List   Diagnosis   • Chest pain   • Diabetes mellitus (CMS/HCC)   • Candidiasis   • Hyperlipemia   • Essential hypertension   • Peripheral vascular disease (CMS/HCC)   • GERD (gastroesophageal reflux disease)   • Coronary artery disease   • Benign prostatic disease     Assessment/Plan:    Active Problems:    Chest pain    Diabetes mellitus (CMS/HCC)    Candidiasis    Hyperlipemia    Essential hypertension    Peripheral vascular  disease (CMS/HCC)    GERD (gastroesophageal reflux disease)    Coronary artery disease    Benign prostatic disease    Left ureteral stricture managed with an indwelling stent.  This is to be changed in approximately 1 month.  No evidence of obstruction at this time.  Urinary retention which is being managed with an indwelling Cedeno catheter that was changed 1 week ago.  UTI with E. Coli  Bacteremia with enterococcus    Plan  Antibiotics per infectious disease  Patient to follow-up with my partner Dr. Pepe in 1 to 2 weeks to further discuss left stent exchange versus removal.  No indication for intervention at this time.  I will sign off.  Please let me know if I can be of further assistance.        Matti Miller MD  1/7/2020  7:00 AM      Electronically signed by Matti Miller MD at 01/07/20 0705            Physical Therapy Notes (last 24 hours) (Notes from 01/06/20 1715 through 01/07/20 1715)      Alba Mireles PT at 01/07/20 1619  Version 1 of 1         Problem: Patient Care Overview  Goal: Plan of Care Review  Outcome: Ongoing (interventions implemented as appropriate)  Flowsheets (Taken 1/7/2020 1614)  Plan of Care Reviewed With: patient; spouse  Outcome Summary: Pt is 75 yo male admitted from rehab for chest pain, sepsis, leukocytosis.  Cardiology recommending conservative medical management.  Pt has right AKA and hx L1 SCI in 1975 per pt report.  Pt requiring assist of two for safety with bed mobility and scooting transfer bed <-> chair with chair perpendicular to bed.  Pt also requiring assist of two for postural control with dynamic sitting balance due to impaired balance.  Pt remains far from mobility baseline of completing lateral transfes with sliding board indep.  PT recommends return to  rehab to increase indep with mobility.  PT will follow 3x/week while at [facility].       Electronically signed by Alba Mireles PT at 01/07/20 1620     Alba Mireles PT at 01/07/20 1621  Version 1 of 1          Patient Name: Artur Solano  : 1945    MRN: [id number]                              Today's Date: 2020       Admit Date: 2020    Visit Dx:     ICD-10-CM ICD-9-CM   1. Chest pain, unspecified type R07.9 786.50   2. Sepsis, due to unspecified organism, unspecified whether acute organ dysfunction present (CMS/[facility]) A41.9 038.9     995.91   3. Leukocytosis, unspecified type D72.829 288.60     Patient Active Problem List   Diagnosis   • Chest pain   • Diabetes mellitus (CMS/[facility])   • Candidiasis   • Hyperlipemia   • Essential hypertension   • Peripheral vascular disease (CMS/[facility])   • GERD (gastroesophageal reflux disease)   • Coronary artery disease   • Benign prostatic disease     Past Medical History:   Diagnosis Date   • Angina at rest (CMS/[facility])    • Benign prostatic disease    • Candidiasis    • Chest pain 2020   • Constipation    • Coronary artery disease    • Cutaneous abscess     right lower limb   • CVA (cerebral vascular accident) (CMS/[facility])     years ago TIA per wife   • Diabetes mellitus (CMS/[facility])     Type 2   • Essential hypertension    • GERD (gastroesophageal reflux disease)    • Hyperlipemia    • Hypoglycemia    • Iron deficiency anemia    • Peripheral vascular disease (CMS/[facility])    • Urethral stricture    • UTI (urinary tract infection)      Past Surgical History:   Procedure Laterality Date   • ABDOMINAL AORTIC ANEURYSM REPAIR     • ABOVE KNEE LEG AMPUTATION Right    • BACK SURGERY  ,    • CARDIAC CATHETERIZATION      with stent placement   • CAROTID ENDARTERECTOMY Left    • FEMORAL ENDARTERECTOMY Bilateral    • HIP PINNING Left      General Information     Row Name 20 1601          PT Evaluation Time/Intention    Document Type  evaluation  -HD     Mode of Treatment  physical therapy  -HD     Row Name 20 1601          General Information    Patient Profile Reviewed?  yes  -HD     Prior Level of Function  -- Pt has been in/out rehab since 2019.  Prior  to Nov, pt living home with wife.  Pt had SCI L1 in 1975 and utilizes sliding board with indep for transfers.  Pt also has right AKA.  Pt has power chair, manual chair.   -HD     Existing Precautions/Restrictions  fall right AKA, SCI L1   -HD     Row Name 01/07/20 1601          Relationship/Environment    Lives With  spouse  -HD     Row Name 01/07/20 1601          Resource/Environmental Concerns    Current Living Arrangements  home/apartment/condo has been in rehab   -HD     Row Name 01/07/20 1601          Cognitive Assessment/Intervention- PT/OT    Orientation Status (Cognition)  oriented x 3  -HD     Row Name 01/07/20 1601          Safety Issues, Functional Mobility    Safety Issues Affecting Function (Mobility)  insight into deficits/self awareness;safety precaution awareness  -HD     Impairments Affecting Function (Mobility)  balance;strength;endurance/activity tolerance;postural/trunk control  -HD       User Key  (r) = Recorded By, (t) = Taken By, (c) = Cosigned By    Initials Name Provider Type    Alba Diaz, PT Physical Therapist        Mobility     Row Name 01/07/20 1603          Bed Mobility Assessment/Treatment    Bed Mobility Assessment/Treatment  supine-sit;sit-supine  -HD     Supine-Sit [place] (Bed Mobility)  2 person assist;moderate assist (50% patient effort)  -HD     Sit-Supine [place] (Bed Mobility)  2 person assist;minimum assist (75% patient effort)  -HD     Row Name 01/07/20 1603          Bed-Chair Transfer    Bed-Chair [place] (Transfers)  2 person assist;moderate assist (50% patient effort) using gray slide sheet, assist of two for transfer to chair with bedside chair perpendicular to bed   -HD     Row Name 01/07/20 1603          Gait/Stairs Assessment/Training    [place] Level (Gait)  unable to assess  -HD       User Key  (r) = Recorded By, (t) = Taken By, (c) = Cosigned By    Initials Name Provider Type    Alba Diaz, PT Physical Therapist         Obj/Interventions     Row Name 01/07/20 1604          General ROM    GENERAL ROM COMMENTS  BUEs WFL, left knee limited 25% knee extension PROM, right AKA  -HD     Row Name 01/07/20 1604          MMT (Manual Muscle Testing)    General MMT Comments  BUEs 4-/5 MMT, left knee 2-/5 MMT, right AKA   -HD     Row Name 01/07/20 1604          Static Sitting Balance    Level of [place] (Unsupported Sitting, Static Balance)  minimal assist, 75% patient effort  -HD     Sitting Position (Unsupported Sitting, Static Balance)  long sitting on bed  -HD     Time Able to Maintain Position (Unsupported Sitting, Static Balance)  1 to 2 minutes  -HD     Row Name 01/07/20 1604          Dynamic Sitting Balance    Level of [place], Reaches Outside Midline (Sitting, Dynamic Balance)  moderate assist, 50 to 74% patient effort;2 person assist  -HD     Sitting Position, Reaches Outside Midline (Sitting, Dynamic Balance)  -- scooting to edge of bed   -HD     Row Name 01/07/20 1604          Static Standing Balance    Level of [place] (Supported Standing, Static Balance)  unable to perform activity  -HD     Row Name 01/07/20 1604          Dynamic Standing Balance    Level of [place], Reaches Outside Midline (Standing, Dynamic Balance)  unable to perform activity  -HD       User Key  (r) = Recorded By, (t) = Taken By, (c) = Cosigned By    Initials Name Provider Type    Alba Diaz, PT Physical Therapist        Goals/Plan     Row Name 01/07/20 1611          Bed Mobility Goal 1 (PT)    Activity/Assistive Device (Bed Mobility Goal 1, PT)  bed mobility activities, all  -HD     [place] Level/Cues Needed (Bed Mobility Goal 1, PT)  supervision required  -HD     Time Frame (Bed Mobility Goal 1, PT)  2 weeks  -HD     Row Name 01/07/20 1611          Transfer Goal 1 (PT)    Activity/Assistive Device (Transfer Goal 1, PT)  bed-to-chair/chair-to-bed  -HD     [place] Level/Cues Needed (Transfer Goal 1, PT)  minimum assist  (75% or more patient effort)  -HD     Time Frame (Transfer Goal 1, PT)  2 weeks  -HD     Row Name 01/07/20 1611          Patient Education Goal (PT)    Activity (Patient Education Goal, PT)  Pt will be able to sit EOB x5 minutes with SBA to exhibit improved postural control.    -HD     Time Frame (Patient Education Goal, PT)  2 weeks  -HD       User Key  (r) = Recorded By, (t) = Taken By, (c) = Cosigned By    Initials Name Provider Type    Alba Diaz, PT Physical Therapist        Clinical Impression     Row Name 01/07/20 1606          Pain Assessment    Additional Documentation  Pain Scale: Numbers Pre/Post-Treatment (Group)  -HD     Row Name 01/07/20 1606          Pain Scale: Numbers Pre/Post-Treatment    Pain Scale: Numbers, Pretreatment  0/10 - no pain  -HD     Pain Scale: Numbers, Post-Treatment  0/10 - no pain  -HD     Row Name 01/07/20 1606          Physical Therapy Clinical Impression    Patient/Family Goals Statement (PT Clinical Impression)  Pt is 75 yo male admitted from rehab for chest pain, sepsis, leukocytosis.  Cardiology recommending conservative medical management.  Pt has right AKA and hx L1 SCI in 1975 per pt report.    -HD     Criteria for Skilled Interventions Met (PT Clinical Impression)  yes;treatment indicated  -HD     Rehab Potential (PT Clinical Summary)  fair, will monitor progress closely  -HD     Predicted Duration of Therapy (PT)  2 weeks   -HD     Row Name 01/07/20 1606          Positioning and Restraints    Pre-Treatment Position  in bed  -HD     Post Treatment Position  bed  -HD     In Bed  notified nsg;encouraged to call for assist;call light within reach;exit alarm on;with family/caregiver  -HD       User Key  (r) = Recorded By, (t) = Taken By, (c) = Cosigned By    Initials Name Provider Type    Alba Diaz, PT Physical Therapist        Outcome Measures     Row Name 01/07/20 1614          How much help from another person do you currently need...    Turning from your  back to your side while in flat bed without using bedrails?  2  -HD     Moving from lying on back to sitting on the side of a flat bed without bedrails?  2  -HD     Moving to and from a bed to a chair (including a wheelchair)?  2  -HD     Standing up from a chair using your arms (e.g., wheelchair, bedside chair)?  1  -HD     Climbing 3-5 steps with a railing?  1  -HD     To walk in hospital room?  1  -HD     AM-PAC 6 Clicks Score (PT)  9  -HD     Row Name 01/07/20 1614          Functional Assessment    Outcome Measure Options  AM-PAC 6 Clicks Basic Mobility (PT)  -HD       User Key  (r) = Recorded By, (t) = Taken By, (c) = Cosigned By    Initials Name Provider Type    Alba Diaz, PT Physical Therapist          PT Recommendation and Plan     Outcome Summary/Treatment Plan (PT)  Anticipated Discharge Disposition (PT): inpatient rehabilitation facility  Plan of Care Reviewed With: patient, spouse  Outcome Summary: Pt is 75 yo male admitted from rehab for chest pain, sepsis, leukocytosis.  Cardiology recommending conservative medical management.  Pt has right AKA and hx L1 SCI in 1975 per pt report.  Pt requiring assist of two for safety with bed mobility and scooting transfer bed <-> chair with chair perpendicular to bed.  Pt also requiring assist of two for postural control with dynamic sitting balance due to impaired balance.  Pt remains far from mobility baseline of completing lateral transfes with sliding board indep.  PT recommends return to  rehab to increase indep with mobility.  PT will follow 3x/week while at [facility].     Time Calculation:   PT Charges     Row Name 01/07/20 1620             Time Calculation    Start Time  1336  -HD      Stop Time  1417  -HD      Time Calculation (min)  41 min  -HD      PT Received On  01/07/20  -HD      PT - Next Appointment  01/08/20  -HD      PT Goal Re-Cert Due Date  01/21/20  -HD         Time Calculation- PT    Total Timed Code Minutes- PT  20 minute(s)  -HD         User Key  (r) = Recorded By, (t) = Taken By, (c) = Cosigned By    Initials Name Provider Type    HD Alba Mireles, PT Physical Therapist        Therapy Charges for Today     Code Description Service Date Service Provider Modifiers Qty    47800221622 HC PT EVAL MOD COMPLEXITY 3 1/7/2020 Alba Mireles, PT GP 1    74722775814 HC PT THERAPEUTIC ACT EA 15 MIN 1/7/2020 Alba Mireles, PT GP 1    22897413636 HC PT NEUROMUSC RE EDUCATION EA 15 MIN 1/7/2020 Alba Mireles, PT GP 1          PT G-Codes  Outcome Measure Options: AM-PAC 6 Clicks Basic Mobility (PT)  AM-PAC 6 Clicks Score (PT): 9    Alba Mireles PT  1/7/2020         Electronically signed by Alba Mireles, PT at 01/07/20 1622         DANIELLE Hou    Phone: [phone]  Cell: [phone]  Fax: [phone]  [email or web address]

## 2020-01-07 NOTE — PROGRESS NOTES
Infectious Diseases Progress Note      LOS: 1 day   Patient Care Team:  Jimy Lopez MD as PCP - General (Family Medicine)    Chief Complaint: Feeling better today, less confusion    Subjective       The patient has been afebrile for the last 24 hours.  The patient is on room air, hemodynamically stable, and is tolerating antimicrobial therapy.    Review of Systems:   Review of Systems   Constitutional: Positive for diaphoresis and fatigue.   HENT: Negative.    Respiratory: Positive for shortness of breath.         Improved   Cardiovascular: Positive for chest pain.        Improved   Gastrointestinal: Negative.    Musculoskeletal: Positive for arthralgias and back pain.   Skin: Positive for wound.   Neurological: Positive for weakness.   Psychiatric/Behavioral: Positive for confusion.        Confusion        Objective     Vital Signs  Temp:  [98.4 °F (36.9 °C)-98.8 °F (37.1 °C)] 98.4 °F (36.9 °C)  Heart Rate:  [] 106  Resp:  [12-20] 17  BP: (111-141)/(65-89) 140/72    Physical Exam:  Physical Exam   Constitutional: He appears well-developed and well-nourished.   Chronically ill-appearing   HENT:   Head: Normocephalic and atraumatic.   Eyes: Pupils are equal, round, and reactive to light. Conjunctivae and EOM are normal.   Neck: Neck supple.   Cardiovascular: Normal rate, regular rhythm and normal heart sounds.   Pulmonary/Chest: Effort normal.   Diminished throughout   Abdominal: Soft. Bowel sounds are normal.   Genitourinary:   Genitourinary Comments: Cedeno catheter in place   Musculoskeletal:   Degenerative changes-right AKA   Neurological: He is alert.   Alert and oriented x2 but appears less confused today   Skin: Skin is warm and dry.   right above-the-knee stump but has an incision at the base of the stump that appears to be healing well.  There also is a healing incision in the right groin but does not show any overt signs of infection.  Patient also has 3 ulcers on his left foot-none of which  appear to be grossly infected.  Patient has very difficult pulses to palpate in his left foot.     Psychiatric: He has a normal mood and affect.   Vitals reviewed.       Results Review:    I have reviewed all clinical data, test, lab, and imaging results.     Radiology  Ct Abdomen Pelvis With & Without Contrast    Result Date: 1/6/2020   DATE OF EXAM: 1/6/2020 10:43 PM  PROCEDURE: CT ABDOMEN PELVIS W WO CONTRAST-  INDICATIONS: UTI; L ureteral stricture s/p stent; R07.9-Chest pain, unspecified; A41.9-Sepsis, unspecified organism; D72.829-Elevated white blood cell count, unspecified  COMPARISON: No Comparisons Available  TECHNIQUE: Multiple axial images were obtained through the abdomen and pelvis without the administration of contrast.  Additional scanning through the abdomen and pelvis followed by the  intravenous administration of 100 mL of Isovue 370. Reconstructed coronal and sagittal images were also obtained. Automated exposure control and iterative construction methods were used.  FINDINGS: There are small bilateral pleural effusions, right greater than left. There is bilateral lower lobe airspace disease, right greater than left likely due to atelectasis.  Superimposed pneumonia cannot be entirely excluded.  There is extensive coronary artery calcification.  There is also aortic valvular calcification.  There is small pericardial effusion.  There is an irregular shaped left ventricular aneurysm arising from the posterior inferior surface of the left ventricle. There are extensive renal vascular calcifications.  No definite renal or ureteral stone identified.  There is a left-sided ureteral stent which appears to be appropriately positioned within the left renal pelvis with the distal portion of the stent within the urinary bladder.  There is no hydronephrosis.  There is a 1.4 cm low-density mass of the upper pole the right kidney compatible with a cyst.  Multiple smaller low density masses are noted of both  kidneys also compatible with cyst.  No right-sided hydronephrosis is seen.  Bilateral adrenal glands are within normal limits.  Liver and pancreas are within normal limits.  There is a well-circumscribed low-density 1 7 m mass centrally within the spleen likely a cyst or possibly a hemangioma.  There are splenic granulomas. Gallbladder is at the upper limits of normal in size.  No inflammatory change seen around the gallbladder.  Upper GI tract appears within normal limits.  There is atherosclerotic vascular calcification of the aorta and abdominal branch vessels.  There is approximately 50% stenosis of the celiac artery.  Patient is status post aorto bifemoral bypass graft placement.  There appears to be complete occlusion of the native aorta and graft just below the renal arteries.  The low left common femoral artery appears to be reconstituted by small peripheral collaterals.  Right common femoral artery does not appear to be reconstituted during this phase of imaging.  There appears to be a pseudoaneurysm at the left common femoral anastomosis.  Pseudoaneurysm measures proximal a 2.4 cm in size.  Pelvis: Cedeno catheter is in place within the urinary bladder which is decompressed.  There is a small amount of free fluid in the pelvis of uncertain etiology.  GI tract appears within normal limits.  The appendix appears normal.  No pelvic or inguinal adenopathy identified. There is marked muscular atrophy of the gluteal and thigh muscles. There are postoperative changes of the left femur related to prior fracture repair.  There are old healed fractures of the superior and inferior pubic rami bilaterally.  There are severe degenerative changes of the hips and lumbar spine.  Patient is status post fusion from the L1-L5 levels.  There are also posterior fusion rods and laminar hooks within the lower thoracic spine.  No lytic or sclerotic bony lesion identified.       1.  Left-sided ureteral stent appropriately  positioned.  No evidence of hydronephrosis.  No definite renal or ureteral stone identified. 2.  Status post distal aorto bifemoral bypass graft placement.  The graft appears to be completely occluded just below the level of the renal arteries.  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  Portion of the left common femoral artery appears to be reconstituted by multiple small collaterals.  The right common femoral artery does not appear to be reconstituted. 3.  Moderate-sized left ventricular aneurysm 4.  Severe muscular atrophy of the pelvis and upper thighs 5.  Bilateral pleural effusions and bilateral lower lobe airspace disease 6.  Small pericardial effusion.  Electronically Signed By-Ted Roberts On:1/6/2020 11:18 PM This report was finalized on [id number] by  Ted Roberts, .      Cardiology    Laboratory  Results from last 7 days   Lab Units 01/07/20  0603   WBC 10*3/mm3 12.20*   HEMOGLOBIN g/dL 9.6*   HEMATOCRIT % 29.8*   PLATELETS 10*3/mm3 395     Results from last 7 days   Lab Units 01/07/20  0603   SODIUM mmol/L 134*   POTASSIUM mmol/L 4.0   CHLORIDE mmol/L 98   CO2 mmol/L 27.0   BUN mg/dL 27*   CREATININE mg/dL 0.60*   GLUCOSE mg/dL 114*   CALCIUM mg/dL 9.5     Results from last 7 days   Lab Units 01/07/20  0603   SODIUM mmol/L 134*   POTASSIUM mmol/L 4.0   CHLORIDE mmol/L 98   CO2 mmol/L 27.0   BUN mg/dL 27*   CREATININE mg/dL 0.60*   GLUCOSE mg/dL 114*   CALCIUM mg/dL 9.5     Results from last 7 days   Lab Units 01/07/20  0603   CK TOTAL U/L 79             Microbiology   Microbiology Results (last 10 days)     Procedure Component Value - Date/Time    Blood Culture - Blood, Blood, Venous Line [[id number]]  (Abnormal) Collected:  01/06/20 1011    Lab Status:  Preliminary result Specimen:  Blood, Venous Line Updated:  01/07/20 0328     Blood Culture Abnormal Stain     Gram Stain Anaerobic Bottle Gram positive cocci in chains      Aerobic Bottle Gram  positive cocci in chains    Blood Culture ID, PCR - Blood, Blood, Venous Line [[id number]]  (Abnormal) Collected:  01/06/20 1011    Lab Status:  Final result Specimen:  Blood, Venous Line Updated:  01/07/20 0328     BCID, PCR Enterococcus spp. Identification by BCID PCR.    Urine Culture - Urine, Urine, Catheter [[id number]]  (Abnormal)  (Susceptibility) Collected:  01/05/20 1436    Lab Status:  Final result Specimen:  Urine, Catheter Updated:  01/07/20 0125     Urine Culture >100,000 CFU/mL Escherichia coli    Susceptibility      Escherichia coli     SUNG     Ampicillin Resistant     Ampicillin + Sulbactam Intermediate     Cefazolin Susceptible     Cefepime Susceptible     Ceftazidime Susceptible     Ceftriaxone Susceptible     Gentamicin Resistant     Levofloxacin Intermediate     Nitrofurantoin Susceptible     Piperacillin + Tazobactam Susceptible     Tetracycline Resistant     Trimethoprim + Sulfamethoxazole Resistant                    Blood Culture - Blood, Hand, Left [[id number]]  (Abnormal) Collected:  01/05/20 1430    Lab Status:  Preliminary result Specimen:  Blood from Hand, Left Updated:  01/06/20 0734     Blood Culture Abnormal Stain     Gram Stain Anaerobic Bottle Gram positive cocci in chains      Aerobic Bottle Gram positive cocci in chains    Influenza Antigen, Rapid - Swab, Nasopharynx [[id number]]  (Normal) Collected:  01/05/20 1422    Lab Status:  Final result Specimen:  Swab from Nasopharynx Updated:  01/05/20 1445     Influenza A PCR Not Detected     Influenza B PCR Not Detected    Blood Culture - Blood, Arm, Left [[id number]]  (Abnormal) Collected:  01/05/20 1421    Lab Status:  Preliminary result Specimen:  Blood from Arm, Left Updated:  01/06/20 2051     Blood Culture Enterococcus species     Isolated from Aerobic and Anaerobic Bottles     Gram Stain Aerobic Bottle Gram positive cocci in chains      Anaerobic Bottle Gram positive cocci in chains    Blood Culture ID, PCR - Blood, Arm, Left  [[id number]]  (Abnormal) Collected:  01/05/20 1421    Lab Status:  Final result Specimen:  Blood from Arm, Left Updated:  01/06/20 0618     BCID, PCR Enterococcus spp. Identification by BCID PCR.     BOTTLE TYPE Aerobic Bottle          Medication Review:       Schedule Meds    aspirin 325 mg Oral Daily   atorvastatin 40 mg Oral Daily   DAPTOmycin 6 mg/kg Intravenous Q24H   gabapentin 100 mg Oral BID   hydrocortisone 1 application Topical Q12H   insulin glargine 10 Units Subcutaneous Nightly   insulin lispro 0-9 Units Subcutaneous 4x Daily With Meals & Nightly   metoprolol tartrate 50 mg Oral TID   pantoprazole 40 mg Oral QAM   piperacillin-tazobactam 3.375 g Intravenous Q8H   sodium chloride 10 mL Intravenous Q12H   tamsulosin 0.4 mg Oral Nightly       Infusion Meds       PRN Meds  dextrose  •  dextrose  •  docusate sodium  •  glucagon (human recombinant)  •  HYDROcodone-acetaminophen  •  insulin lispro **AND** insulin lispro  •  ipratropium-albuterol  •  ketamine (KETALAR) infusion **AND** Ketamine Vital Signs & Assessment  •  nitroglycerin  •  ondansetron **OR** ondansetron  •  [COMPLETED] Insert peripheral IV **AND** sodium chloride  •  sodium chloride        Assessment/Plan       Antimicrobial Therapy   1.  IV daptomycin    Day 2  2.  IV Zosyn     Day 2  3.      Day  4.      Day  5.      Day      Assessment     Bacteremia-cultures growing enterococcus  -Need to rule out intra-abdominal source especially since patient has a vascular graft  -Looking over the records from [place] it appears that the patient had a UTI with enterococcus during in November 2019 admission (11/29/19)  -CT of the abdomen pelvis shows left-sided ureteral stent appropriately positioned and no evidence of hydronephrosis, there is a distal aortobifemoral bypass graft appears to be completely occluded below the level of the renal arteries and a pseudoaneurysm measuring 2.5 cm in the left common femoral anastomosis  -Repeat blood cultures are  also positive for enterococcus     Urinary tract infection-cultures are growing E. Coli  -Patient has had a Cedeno catheter in due to a recent groin surgery     History of 3 recent surgeries on the patient's right stump due to infection with fungus  -Patient had a informal debridement of a mass at the distal end of his right AKA on 11/8/2019 that grew fungal cultures  -Cultures from 11/8/2019 and 11/14/19 grew Candida glabrata-susceptible to Anidulafungin, Caspofungin, Micafungin, dose-dependent susceptibility to Diflucan with an SUNG of 2   -Patient then had a more formal I&D on 11/14/2019  -Heart catheterization on 11/2/2019  -Patient's last surgery was on 11/26/2019- (excision of right limb of an atrial femoral bypass and repair of previous vein bypass )cultures from that surgery appear to be negative for any fungal infections and blood cultures from that timeframe are also are negative for any fungal infection  -However cultures did show growth of Staphylococcus epidermidis and Staphylococcus warneri-both methicillin resistant  -Patient was supposed to be on antifungal treatment until 1/7/2020 -patient received approximately 6 weeks of IV Anidulafungin 100mg qd    Patient's current wounds including an healing incision on the right stump and in the right groin-no overt signs of infection in either area    Patient has 3 ulcers on his left foot that do not show any signs of overt infection      Complains of chest pain that is especially with deep breathing, coughing, and movement     Confusion  -Could be related to infection or use of IV cefepime     History of multiple vascular surgeries including repair of a aortic aneurysm   -Aortofemoral bypass in January 2005 for treatment of abdominal aortic, iliac, and left femoral aneurysm  -Left carotid enterectomy January 2011  -Right femoral to anterior tibial bypass with arm vein  -Right AKA on 8/4/2017 due to vascular graft occlusion     History of CAD with cardiac  catheterization in 2005      History of prostate cancer     History of subdural hematoma in 7/2019     Plan     Continue IV daptomycin   Continue IV Zosyn 3.375 g every 8 hours  Waiting for blood cultures to finalize  Repeat blood culture  Consult vascular surgery-discussed case with vascular surgery, apparently the wife was not cooperative with the physician assistant and refused to see the vascular surgeon-they are recommending that if there is any possibility of graft infection that the patient should be transferred back to [facility] to see his normal vascular surgeon  Case discussed with hospitalist  Consult cardiology for MIKKI  Palliative care was consulted  Continue supportive care  A.m. Labs with ANIYA Hopkins, APRN  01/07/20  12:03 PM

## 2020-01-07 NOTE — PLAN OF CARE
Continue with antibiotic therapy, blood cultures returned this AM positive with enterococcus.  Left leg elevated on pillow with heal off pillow.  Turned and repositioned every 2 hours.  Cedeno remains to bedside drainage.

## 2020-01-07 NOTE — PROGRESS NOTES
Continued Stay Note   [place]     Patient Name: Artur Solano  MRN: [id number]  Today's Date: 1/7/2020    Admit Date: 1/5/2020    Discharge Plan     Row Name 01/07/20 1657       Plan    Plan  DC Plan: Pending needs at d/c. From [facility] (okay to return per facility). Potential home with HH. No precert/PASRR required.     Plan Comments  SW met with patient's spouse again today (1/7) regarding d/c plan. Spouse continues to have questions regarding pt's clinical needs, which SW directs for spouse to ask MDs when they are rounding. Advised that facility is willing to have pt come back at d/c & that we requested PT work with pt for recommendation. Spouse verbalized understanding, but expressed interest in going home with HH & if abxs needed at d/c arranging for those at home. Will see what PT recommends & readdress with spouse. Spouse agreeable to plan at this time. YUDI did fax updates to facility at spouse's request.      DANIELLE Hou    Phone: [phone]  Cell: [phone]  Fax: [phone]  [email or web address]

## 2020-01-08 ENCOUNTER — APPOINTMENT (OUTPATIENT)
Dept: CARDIOLOGY | Facility: HOSPITAL | Age: 75
End: 2020-01-08

## 2020-01-08 ENCOUNTER — ANESTHESIA (OUTPATIENT)
Dept: CARDIOLOGY | Facility: HOSPITAL | Age: 75
End: 2020-01-08

## 2020-01-08 ENCOUNTER — ANESTHESIA EVENT (OUTPATIENT)
Dept: CARDIOLOGY | Facility: HOSPITAL | Age: 75
End: 2020-01-08

## 2020-01-08 VITALS
DIASTOLIC BLOOD PRESSURE: 65 MMHG | HEIGHT: 71 IN | OXYGEN SATURATION: 100 % | RESPIRATION RATE: 20 BRPM | HEART RATE: 85 BPM | WEIGHT: 157.19 LBS | SYSTOLIC BLOOD PRESSURE: 119 MMHG | TEMPERATURE: 97.6 F | BODY MASS INDEX: 22.01 KG/M2

## 2020-01-08 LAB
ANION GAP SERPL CALCULATED.3IONS-SCNC: 8 MMOL/L (ref 5–15)
BASOPHILS # BLD AUTO: 0.1 10*3/MM3 (ref 0–0.2)
BASOPHILS NFR BLD AUTO: 0.8 % (ref 0–1.5)
BUN BLD-MCNC: 27 MG/DL (ref 8–23)
BUN/CREAT SERPL: 45.8 (ref 7–25)
CALCIUM SPEC-SCNC: 9.1 MG/DL (ref 8.6–10.5)
CHLORIDE SERPL-SCNC: 101 MMOL/L (ref 98–107)
CO2 SERPL-SCNC: 26 MMOL/L (ref 22–29)
CREAT BLD-MCNC: 0.59 MG/DL (ref 0.76–1.27)
DEPRECATED RDW RBC AUTO: 57.3 FL (ref 37–54)
EOSINOPHIL # BLD AUTO: 0.1 10*3/MM3 (ref 0–0.4)
EOSINOPHIL NFR BLD AUTO: 1.4 % (ref 0.3–6.2)
ERYTHROCYTE [DISTWIDTH] IN BLOOD BY AUTOMATED COUNT: 20.6 % (ref 12.3–15.4)
GFR SERPL CREATININE-BSD FRML MDRD: 134 ML/MIN/1.73
GLUCOSE BLD-MCNC: 231 MG/DL (ref 65–99)
GLUCOSE BLDC GLUCOMTR-MCNC: 184 MG/DL (ref 70–105)
GLUCOSE BLDC GLUCOMTR-MCNC: 222 MG/DL (ref 70–105)
GLUCOSE BLDC GLUCOMTR-MCNC: 298 MG/DL (ref 70–105)
HCT VFR BLD AUTO: 27.1 % (ref 37.5–51)
HGB BLD-MCNC: 8.8 G/DL (ref 13–17.7)
LYMPHOCYTES # BLD AUTO: 1.3 10*3/MM3 (ref 0.7–3.1)
LYMPHOCYTES NFR BLD AUTO: 15.3 % (ref 19.6–45.3)
MCH RBC QN AUTO: 26.1 PG (ref 26.6–33)
MCHC RBC AUTO-ENTMCNC: 32.7 G/DL (ref 31.5–35.7)
MCV RBC AUTO: 79.8 FL (ref 79–97)
MONOCYTES # BLD AUTO: 0.9 10*3/MM3 (ref 0.1–0.9)
MONOCYTES NFR BLD AUTO: 9.8 % (ref 5–12)
NEUTROPHILS # BLD AUTO: 6.3 10*3/MM3 (ref 1.7–7)
NEUTROPHILS NFR BLD AUTO: 72.7 % (ref 42.7–76)
NRBC BLD AUTO-RTO: 0 /100 WBC (ref 0–0.2)
PLATELET # BLD AUTO: 359 10*3/MM3 (ref 140–450)
PMV BLD AUTO: 6.4 FL (ref 6–12)
POTASSIUM BLD-SCNC: 4.2 MMOL/L (ref 3.5–5.2)
RBC # BLD AUTO: 3.39 10*6/MM3 (ref 4.14–5.8)
SODIUM BLD-SCNC: 135 MMOL/L (ref 136–145)
WBC NRBC COR # BLD: 8.7 10*3/MM3 (ref 3.4–10.8)

## 2020-01-08 PROCEDURE — 97530 THERAPEUTIC ACTIVITIES: CPT

## 2020-01-08 PROCEDURE — 80048 BASIC METABOLIC PNL TOTAL CA: CPT | Performed by: HOSPITALIST

## 2020-01-08 PROCEDURE — 85025 COMPLETE CBC W/AUTO DIFF WBC: CPT | Performed by: HOSPITALIST

## 2020-01-08 PROCEDURE — 25010000002 PROPOFOL 10 MG/ML EMULSION: Performed by: ANESTHESIOLOGY

## 2020-01-08 PROCEDURE — 93320 DOPPLER ECHO COMPLETE: CPT

## 2020-01-08 PROCEDURE — 25010000002 PIPERACILLIN SOD-TAZOBACTAM PER 1 G: Performed by: NURSE PRACTITIONER

## 2020-01-08 PROCEDURE — B246ZZ4 ULTRASONOGRAPHY OF RIGHT AND LEFT HEART, TRANSESOPHAGEAL: ICD-10-PCS | Performed by: INTERNAL MEDICINE

## 2020-01-08 PROCEDURE — 99222 1ST HOSP IP/OBS MODERATE 55: CPT | Performed by: NURSE PRACTITIONER

## 2020-01-08 PROCEDURE — 99239 HOSP IP/OBS DSCHRG MGMT >30: CPT | Performed by: HOSPITALIST

## 2020-01-08 PROCEDURE — 93312 ECHO TRANSESOPHAGEAL: CPT

## 2020-01-08 PROCEDURE — 63710000001 INSULIN LISPRO (HUMAN) PER 5 UNITS: Performed by: INTERNAL MEDICINE

## 2020-01-08 PROCEDURE — 87070 CULTURE OTHR SPECIMN AEROBIC: CPT | Performed by: NURSE PRACTITIONER

## 2020-01-08 PROCEDURE — 99232 SBSQ HOSP IP/OBS MODERATE 35: CPT | Performed by: INTERNAL MEDICINE

## 2020-01-08 PROCEDURE — 93325 DOPPLER ECHO COLOR FLOW MAPG: CPT

## 2020-01-08 PROCEDURE — 82962 GLUCOSE BLOOD TEST: CPT

## 2020-01-08 RX ORDER — PROPOFOL 10 MG/ML
VIAL (ML) INTRAVENOUS AS NEEDED
Status: DISCONTINUED | OUTPATIENT
Start: 2020-01-08 | End: 2020-01-08 | Stop reason: SURG

## 2020-01-08 RX ORDER — SODIUM CHLORIDE 9 MG/ML
INJECTION, SOLUTION INTRAVENOUS CONTINUOUS PRN
Status: DISCONTINUED | OUTPATIENT
Start: 2020-01-08 | End: 2020-01-08 | Stop reason: SURG

## 2020-01-08 RX ADMIN — METOPROLOL TARTRATE 50 MG: 50 TABLET, FILM COATED ORAL at 18:14

## 2020-01-08 RX ADMIN — PROPOFOL 50 MG: 10 INJECTION, EMULSION INTRAVENOUS at 16:58

## 2020-01-08 RX ADMIN — Medication 10 ML: at 08:46

## 2020-01-08 RX ADMIN — GABAPENTIN 100 MG: 100 CAPSULE ORAL at 08:46

## 2020-01-08 RX ADMIN — METOPROLOL TARTRATE 50 MG: 50 TABLET, FILM COATED ORAL at 08:45

## 2020-01-08 RX ADMIN — PIPERACILLIN AND TAZOBACTAM 3.38 G: 3; .375 INJECTION, POWDER, LYOPHILIZED, FOR SOLUTION INTRAVENOUS at 18:14

## 2020-01-08 RX ADMIN — INSULIN LISPRO 2 UNITS: 100 INJECTION, SOLUTION INTRAVENOUS; SUBCUTANEOUS at 08:23

## 2020-01-08 RX ADMIN — SODIUM CHLORIDE: 9 INJECTION, SOLUTION INTRAVENOUS at 16:50

## 2020-01-08 RX ADMIN — PIPERACILLIN AND TAZOBACTAM 3.38 G: 3; .375 INJECTION, POWDER, LYOPHILIZED, FOR SOLUTION INTRAVENOUS at 08:46

## 2020-01-08 RX ADMIN — ATORVASTATIN CALCIUM 40 MG: 40 TABLET, FILM COATED ORAL at 08:45

## 2020-01-08 RX ADMIN — ASPIRIN 325 MG ORAL TABLET 325 MG: 325 PILL ORAL at 08:45

## 2020-01-08 RX ADMIN — HYDROCORTISONE 1 APPLICATION: 1 OINTMENT TOPICAL at 08:46

## 2020-01-08 RX ADMIN — PROPOFOL 50 MG: 10 INJECTION, EMULSION INTRAVENOUS at 17:00

## 2020-01-08 RX ADMIN — PANTOPRAZOLE SODIUM 40 MG: 40 TABLET, DELAYED RELEASE ORAL at 08:45

## 2020-01-08 RX ADMIN — PROPOFOL 50 MG: 10 INJECTION, EMULSION INTRAVENOUS at 17:02

## 2020-01-08 RX ADMIN — INSULIN LISPRO 4 UNITS: 100 INJECTION, SOLUTION INTRAVENOUS; SUBCUTANEOUS at 12:58

## 2020-01-08 RX ADMIN — PROPOFOL 100 MG: 10 INJECTION, EMULSION INTRAVENOUS at 16:53

## 2020-01-08 RX ADMIN — PROPOFOL 50 MG: 10 INJECTION, EMULSION INTRAVENOUS at 17:04

## 2020-01-08 NOTE — PLAN OF CARE
Problem: Fall Risk (Adult)  Goal: Identify Related Risk Factors and Signs and Symptoms  Outcome: Outcome(s) achieved  Goal: Absence of Fall  Outcome: Ongoing (interventions implemented as appropriate)  Flowsheets (Taken 1/8/2020 0400)  Absence of Fall: making progress toward outcome     Problem: Patient Care Overview  Goal: Plan of Care Review  Outcome: Ongoing (interventions implemented as appropriate)  Flowsheets (Taken 1/8/2020 0400)  Progress: no change  Plan of Care Reviewed With: patient; spouse  Note:   Pt in good spirits, but c/o body aches - no source of infection identified as of now - monitoring carefully for potential escalation to sepsis     Problem: Skin Injury Risk (Adult)  Goal: Identify Related Risk Factors and Signs and Symptoms  Outcome: Outcome(s) achieved  Flowsheets (Taken 1/8/2020 0400)  Related Risk Factors (Skin Injury Risk): advanced age; critical care admission; infection; mechanical forces; mobility impaired; tissue perfusion altered  Goal: Skin Health and Integrity  Outcome: Ongoing (interventions implemented as appropriate)  Flowsheets (Taken 1/8/2020 0400)  Skin Health and Integrity: making progress toward outcome

## 2020-01-08 NOTE — CONSULTS
Palliative Care Consultation    Patient Code Status    Code Status and Medical Interventions:   Ordered at: 01/1945     Level Of Support Discussed With:    Patient     Code Status:    CPR     Medical Interventions (Level of Support Prior to Arrest):    Full       Requesting clinician:  Consulting Physician(s)     Provider Relationship Specialty    Falguni Ng MD Consulting Physician Cardiology    Jarocho Pickard MD Consulting Physician Hospitalist    Matti Miller MD Consulting Physician Urology    Jack Ling MD Consulting Physician Infectious Diseases        Reason for consult: Consultation for clarification of goals of care and code status.      Chief Complaint    Chest pain    History of Present Illness    Artur Solano is a 74 y.o. male who presented to the ED on 1/5/20 with complaints of chest pain. Patient has a known history of ischemic cardiomyopathy with EF 35%  Recent cardiac cath at [facility] with significant disease involving the posterior lateral branch of the RCA but due to small size, medical management was best option.  ID consulted due to bacteremia from UTI, noted recent admission at [facility]  With transfer to rehab facility after multiple surgeries to his right stump.   Noted left foot with 3 ulcers noted.  Patient is awake and alert, his spouse if 22 yrs has gone home to care for their cats but plans to return for his MIKKI later today.  Patient does not have any children but has step children. He is dependent on his spouse for most of his care and agreeable that she cares for his very well.  He verbalized understanding why he is having a MIKKI later today with further discussion regarding options to transfer to primary vascular surgery group if any further needs from vascular surgery standpoint.  Patient talks a lot about life but defers any questions regarding his health to his spouse.  Emotional support and disease process education provided.    Advanced Care  Planning    Advanced Directives: Patient does not have advance directive  Health Care Directive on file: No  Health Care Surrogate:      Comments: no advance directive in EMR    Assessment/Plan   Chest pain with recent cardiac cath at [facility] / medical management  Ischemic cardiomyopathy with EF 35%  Bacteremia / UTI  Peripheral vascular disease / right above knee amputation 2017  CVA  Hyperlipidemia  BPH  Diabetes mellitus type 2  Confusion  History of prostate cancer  Ulcers left foot x3    Active Problems:    Chest pain    Diabetes mellitus (CMS/[facility])    Candidiasis    Hyperlipemia    Essential hypertension    Peripheral vascular disease (CMS/[facility])    GERD (gastroesophageal reflux disease)    Coronary artery disease    Benign prostatic disease    Bacteremia due to Enterococcus    Urinary tract infection associated with indwelling urethral catheter (CMS/[facility])    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 heel and midfoot (CMS/[facility])    Vascular device, implant, or graft infection or inflammation (CMS/[facility])      Plan    Await MIKKI results for further decisions, currently wants to remain full code and continue aggressive treatments.    Review of Systems   Constitutional: Positive for fatigue. Negative for unexpected weight change.   HENT: Negative.    Eyes: Negative.    Respiratory: Positive for chest tightness and shortness of breath.    Cardiovascular: Negative.    Gastrointestinal: Negative.    Endocrine: Negative.    Genitourinary: Negative.    Musculoskeletal: Negative.    Skin: Negative.    Neurological: Negative.    Hematological: Negative.    Psychiatric/Behavioral: Negative.          Past Medical History:   Diagnosis Date   • Angina at rest (CMS/[facility])    • Benign prostatic disease    • Candidiasis    • Chest pain 01/05/2020   • Constipation    • Coronary artery disease    • Cutaneous abscess     right lower limb   • CVA (cerebral vascular accident) (CMS/[facility])     years ago TIA per wife   • Diabetes mellitus (CMS/[facility])      Type 2   • Essential hypertension    • GERD (gastroesophageal reflux disease)    • Hyperlipemia    • Hypoglycemia    • Iron deficiency anemia    • Peripheral vascular disease (CMS/[facility])    • Urethral stricture    • UTI (urinary tract infection)      Past Surgical History:   Procedure Laterality Date   • ABDOMINAL AORTIC ANEURYSM REPAIR     • ABOVE KNEE LEG AMPUTATION Right    • BACK SURGERY  1975, 2008   • CARDIAC CATHETERIZATION  2005    with stent placement   • CAROTID ENDARTERECTOMY Left    • FEMORAL ENDARTERECTOMY Bilateral    • HIP PINNING Left      History reviewed. No pertinent family history.  Social History     Tobacco Use   • Smoking status: Former Smoker   • Tobacco comment: quit 2012   Substance Use Topics   • Alcohol use: Not on file   • Drug use: Never     Medications Prior to Admission   Medication Sig Dispense Refill Last Dose   • anidulafungin (ERAXIS) 100 MG injection Infuse 100 mg into a venous catheter Every Night. 34 total days; last day Wednesday, 1/8/20      • aspirin 325 MG tablet Take 325 mg by mouth Daily.      • atorvastatin (LIPITOR) 40 MG tablet Take 40 mg by mouth Daily.      • Cranberry-Vitamin C-Probiotic (AZO CRANBERRY) 250-30 MG tablet Take 1 tablet by mouth Daily As Needed (bladder spasms).      • famotidine (PEPCID) 20 MG tablet Take 20 mg by mouth 2 (Two) Times a Day As Needed.      • gabapentin (NEURONTIN) 100 MG capsule Take 100 mg by mouth 2 (Two) Times a Day.      • glipizide (GLUCOTROL) 10 MG tablet Take 10 mg by mouth 2 (Two) Times a Day Before Meals.      • hydrocortisone 1 % ointment Apply 1 application topically to the appropriate area as directed 2 (Two) Times a Day As Needed.      • insulin detemir (LEVEMIR) 100 UNIT/ML injection Inject 20 Units under the skin into the appropriate area as directed Every Morning.      • ipratropium-albuterol (DUO-NEB) 0.5-2.5 mg/3 ml nebulizer Take 3 mL by nebulization 3 (Three) Times a Day As Needed for Wheezing.      • metFORMIN  (GLUCOPHAGE) 1000 MG tablet Take 1,000 mg by mouth 2 (Two) Times a Day With Meals.      • metoprolol tartrate (LOPRESSOR) 50 MG tablet Take 50 mg by mouth 2 (Two) Times a Day.      • nitroglycerin (NITROSTAT) 0.4 MG SL tablet Place 0.4 mg under the tongue Every 5 (Five) Minutes As Needed for Chest Pain. Take no more than 3 doses in 15 minutes.      • SITagliptin (JANUVIA) 100 MG tablet Take 100 mg by mouth Every Night.      • tamsulosin (FLOMAX) 0.4 MG capsule 24 hr capsule Take 0.4 mg by mouth Daily.          Allergies  Keflex [cephalexin] and Lisinopril    Scheduled Meds:    aspirin 325 mg Oral Daily   atorvastatin 40 mg Oral Daily   DAPTOmycin 6 mg/kg Intravenous Q24H   gabapentin 100 mg Oral BID   hydrocortisone 1 application Topical Q12H   insulin glargine 10 Units Subcutaneous Nightly   insulin lispro 0-9 Units Subcutaneous 4x Daily With Meals & Nightly   metoprolol tartrate 50 mg Oral TID   pantoprazole 40 mg Oral QAM   piperacillin-tazobactam 3.375 g Intravenous Q8H   sodium chloride 10 mL Intravenous Q12H   tamsulosin 0.4 mg Oral Nightly     Continuous Infusions:     PRN Meds:  dextrose  •  dextrose  •  docusate sodium  •  glucagon (human recombinant)  •  HYDROcodone-acetaminophen  •  insulin lispro **AND** insulin lispro  •  ipratropium-albuterol  •  ketamine (KETALAR) infusion **AND** Ketamine Vital Signs & Assessment  •  nitroglycerin  •  ondansetron **OR** ondansetron  •  [COMPLETED] Insert peripheral IV **AND** sodium chloride  •  sodium chloride    Lab Results (last 24 hours)     Procedure Component Value Units Date/Time    Blood Culture - Blood, Blood, Venous Line [[id number]]  (Abnormal) Collected:  01/06/20 1011    Specimen:  Blood, Venous Line Updated:  01/08/20 0556     Blood Culture Enterococcus species     Isolated from Aerobic and Anaerobic Bottles     Gram Stain Anaerobic Bottle Gram positive cocci in chains      Aerobic Bottle Gram positive cocci in chains    Basic Metabolic Panel [[id number]]   (Abnormal) Collected:  01/08/20 0442    Specimen:  Blood Updated:  01/08/20 0511     Glucose 231 mg/dL      BUN 27 mg/dL      Creatinine 0.59 mg/dL      Sodium 135 mmol/L      Potassium 4.2 mmol/L      Chloride 101 mmol/L      CO2 26.0 mmol/L      Calcium 9.1 mg/dL      eGFR Non African Amer 134 mL/min/1.73      BUN/Creatinine Ratio 45.8     Anion Gap 8.0 mmol/L     Narrative:       GFR Normal >60  Chronic Kidney Disease <60  Kidney Failure <15      CBC & Differential [[id number]] Collected:  01/08/20 0442    Specimen:  Blood Updated:  01/08/20 0459    Narrative:       The following orders were created for panel order CBC & Differential.  Procedure                               Abnormality         Status                     ---------                               -----------         ------                     CBC Auto Differential[[id number]]        Abnormal            Final result                 Please view results for these tests on the individual orders.    CBC Auto Differential [[id number]]  (Abnormal) Collected:  01/08/20 0442    Specimen:  Blood Updated:  01/08/20 0459     WBC 8.70 10*3/mm3      RBC 3.39 10*6/mm3      Hemoglobin 8.8 g/dL      Hematocrit 27.1 %      MCV 79.8 fL      MCH 26.1 pg      MCHC 32.7 g/dL      RDW 20.6 %      RDW-SD 57.3 fl      MPV 6.4 fL      Platelets 359 10*3/mm3      Neutrophil % 72.7 %      Lymphocyte % 15.3 %      Monocyte % 9.8 %      Eosinophil % 1.4 %      Basophil % 0.8 %      Neutrophils, Absolute 6.30 10*3/mm3      Lymphocytes, Absolute 1.30 10*3/mm3      Monocytes, Absolute 0.90 10*3/mm3      Eosinophils, Absolute 0.10 10*3/mm3      Basophils, Absolute 0.10 10*3/mm3      nRBC 0.0 /100 WBC     POC Glucose Once [[id number]]  (Abnormal) Collected:  01/07/20 2054    Specimen:  Blood Updated:  01/07/20 2057     Glucose 328 mg/dL      Comment: Serial Number: [id number]:  [id number]       Blood Culture - Blood, Arm, Left [[id number]] Collected:  01/07/20 1833    Specimen:   Blood from Arm, Left Updated:  01/07/20 1842    Blood Culture - Blood, Arm, Left [[id number]] Collected:  01/07/20 1834    Specimen:  Blood from Arm, Left Updated:  01/07/20 1842    POC Glucose Once [[id number]]  (Abnormal) Collected:  01/07/20 1627    Specimen:  Blood Updated:  01/07/20 1631     Glucose 232 mg/dL      Comment: Serial Number: [id number]:  [id number]       Blood Culture - Blood, Hand, Left [[id number]]  (Abnormal) Collected:  01/05/20 1430    Specimen:  Blood from Hand, Left Updated:  01/07/20 1404     Blood Culture Enterococcus faecalis     Isolated from Aerobic and Anaerobic Bottles     Gram Stain Anaerobic Bottle Gram positive cocci in chains      Aerobic Bottle Gram positive cocci in chains    Narrative:       Refer to previous blood culture collected on  1/5/2020 1421 for MICs.    Blood Culture - Blood, Arm, Left [[id number]]  (Abnormal)  (Susceptibility) Collected:  01/05/20 1421    Specimen:  Blood from Arm, Left Updated:  01/07/20 1402     Blood Culture Enterococcus faecalis     Isolated from Aerobic and Anaerobic Bottles     Gram Stain Aerobic Bottle Gram positive cocci in chains      Anaerobic Bottle Gram positive cocci in chains    Susceptibility      Enterococcus faecalis     SUNG     Ampicillin Susceptible     Vancomycin Susceptible                    POC Glucose Once [[id number]]  (Abnormal) Collected:  01/07/20 1129    Specimen:  Blood Updated:  01/07/20 1143     Glucose 155 mg/dL      Comment: Serial Number: [id number]:  [id number]                 Palliative Assessment     Vital Signs (last 24 hours)       01/07 0700  -  01/08 0659 01/08 0700  -  01/08 0902   Most Recent    Temp (°F) 97.4 -  98.4       98.2 (36.8)    Heart Rate 81 -  106       87    Resp 16 -  20       20    /48 -  140/72       117/68    SpO2 (%) 90 -  100       96        Physical Exam   Constitutional: He is oriented to person, place, and time. He appears well-developed and well-nourished.   HENT:   Head:  Normocephalic.   Eyes: Pupils are equal, round, and reactive to light.   Cardiovascular: Normal rate and regular rhythm.   Pulmonary/Chest: Effort normal and breath sounds normal.   Abdominal: Soft. Bowel sounds are normal.   Neurological: He is alert and oriented to person, place, and time.   Skin: Skin is warm and dry.   Right leg above knee amputation noted   Vitals reviewed.        Decisional Capacity: yes  Patient's understanding of illness: yes  Patient goals of care:  Ongoing discussion        VASQUEZ Begum

## 2020-01-08 NOTE — THERAPY TREATMENT NOTE
Patient Name: Artur Solano  : 1945    MRN: [id number]                              Today's Date: 2020       Admit Date: 2020    Visit Dx:     ICD-10-CM ICD-9-CM   1. Chest pain, unspecified type R07.9 786.50   2. Sepsis, due to unspecified organism, unspecified whether acute organ dysfunction present (CMS/[facility]) A41.9 038.9     995.91   3. Leukocytosis, unspecified type D72.829 288.60     Patient Active Problem List   Diagnosis   • Chest pain   • Diabetes mellitus (CMS/[facility])   • Candidiasis   • Hyperlipemia   • Essential hypertension   • Peripheral vascular disease (CMS/[facility])   • GERD (gastroesophageal reflux disease)   • Coronary artery disease   • Benign prostatic disease   • Bacteremia due to Enterococcus   • Urinary tract infection associated with indwelling urethral catheter (CMS/[facility])   • 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 heel and midfoot (CMS/[facility])   • Vascular device, implant, or graft infection or inflammation (CMS/[facility])     Past Medical History:   Diagnosis Date   • Angina at rest (CMS/[facility])    • Benign prostatic disease    • Candidiasis    • Chest pain 2020   • Constipation    • Coronary artery disease    • Cutaneous abscess     right lower limb   • CVA (cerebral vascular accident) (CMS/[facility])     years ago TIA per wife   • Diabetes mellitus (CMS/[facility])     Type 2   • Essential hypertension    • GERD (gastroesophageal reflux disease)    • Hyperlipemia    • Hypoglycemia    • Iron deficiency anemia    • Peripheral vascular disease (CMS/[facility])    • Urethral stricture    • UTI (urinary tract infection)      Past Surgical History:   Procedure Laterality Date   • ABDOMINAL AORTIC ANEURYSM REPAIR     • ABOVE KNEE LEG AMPUTATION Right    • BACK SURGERY  2008   • CARDIAC CATHETERIZATION      with stent placement   • CAROTID ENDARTERECTOMY Left    • FEMORAL ENDARTERECTOMY Bilateral    • HIP PINNING Left      General Information     Row Name 20 1620          PT Evaluation  Time/Intention    Document Type  therapy note (daily note)  -SC     Mode of Treatment  individual therapy;physical therapy  -SC     Row Name 01/08/20 1620          Cognitive Assessment/Intervention- PT/OT    Orientation Status (Cognition)  oriented x 3  -[facility] Name 01/08/20 1620          Safety Issues, Functional Mobility    Impairments Affecting Function (Mobility)  balance;strength;endurance/activity tolerance;postural/trunk control  -SC       User Key  (r) = Recorded By, (t) = Taken By, (c) = Cosigned By    Initials Name Provider Type    Lanette Carroll PTA Physical Therapy Assistant        Mobility     Row Name 01/08/20 1621          Bed Mobility Assessment/Treatment    Bed Mobility Assessment/Treatment  supine-sit;sit-supine  -SC     Supine-Sit [place] (Bed Mobility)  minimum assist (75% patient effort)  -SC     Sit-Supine [place] (Bed Mobility)  minimum assist (75% patient effort)  -SC     Comment (Bed Mobility)  pt with improved  ability to assist with transfer at eob and then back to supine  -[facility] Name 01/08/20 1621          Transfer Assessment/Treatment    Comment (Transfers)  no transfers today due to pt going to MIKKI at this time  -SC       User Key  (r) = Recorded By, (t) = Taken By, (c) = Cosigned By    Initials Name Provider Type    SC Lanette Montero PTA Physical Therapy Assistant        Obj/Interventions     [facility] Name 01/08/20 1622          Static Sitting Balance    Level of [place] (Unsupported Sitting, Static Balance)  contact guard assist  -SC     Time Able to Maintain Position (Unsupported Sitting, Static Balance)  more than 5 minutes  -SC     Comment (Unsupported Sitting, Static Balance)  pt not fully at eob and holding onto bed rail at times for support  -[facility] Name 01/08/20 1622          Dynamic Sitting Balance    Level of [place], Reaches Outside Midline (Sitting, Dynamic Balance)  contact guard assist  -SC     Sitting Position, Reaches Outside  Midline (Sitting, Dynamic Balance)  sitting on edge of bed  -SC     Comment, Reaches Outside Midline (Sitting, Dynamic Balance)  good effort to scoot out to eob  -SC       User Key  (r) = Recorded By, (t) = Taken By, (c) = Cosigned By    Initials Name Provider Type    Lanette Carroll PTA Physical Therapy Assistant        Goals/Plan    No documentation.       Clinical Impression     Row Name 01/08/20 1624          Pain Assessment    Additional Documentation  Pain Scale: Numbers Pre/Post-Treatment (Group)  -SC     Row Name 01/08/20 1624          Pain Scale: Numbers Pre/Post-Treatment    Pain Scale: Numbers, Pretreatment  0/10 - no pain  -SC     Pain Scale: Numbers, Post-Treatment  0/10 - no pain  -SC     Row Name 01/08/20 1624          Positioning and Restraints    Pre-Treatment Position  in bed  -SC     Post Treatment Position  bed  -SC     In Bed  with other staff pt going for a MIKKI  -SC       User Key  (r) = Recorded By, (t) = Taken By, (c) = Cosigned By    Initials Name Provider Type    Lanette Carroll PTA Physical Therapy Assistant        Outcome Measures     Row Name 01/08/20 1625          How much help from another person do you currently need...    Turning from your back to your side while in flat bed without using bedrails?  3  -SC     Moving from lying on back to sitting on the side of a flat bed without bedrails?  3  -SC     Moving to and from a bed to a chair (including a wheelchair)?  2  -SC     Standing up from a chair using your arms (e.g., wheelchair, bedside chair)?  1  -SC     Climbing 3-5 steps with a railing?  1  -SC     To walk in hospital room?  1  -SC     AM-PAC 6 Clicks Score (PT)  11  -SC     Row Name 01/08/20 1625          Functional Assessment    Outcome Measure Options  AM-PAC 6 Clicks Basic Mobility (PT)  -SC       User Key  (r) = Recorded By, (t) = Taken By, (c) = Cosigned By    Initials Name Provider Type    Lanette Carroll PTA Physical Therapy Assistant          PT  Recommendation and Plan     Outcome Summary/Treatment Plan (PT)  Anticipated Discharge Disposition (PT): inpatient rehabilitation facility  Plan of Care Reviewed With: patient, spouse  Progress: improving  Outcome Summary: pt vinicio tx well.  pt making good effort to come to sit at eob today.  will attempt sliding board transfer next tx.  recommend pt return to IP rehab to recover his mobility before returning home with his wife     Time Calculation:   PT Charges     Row Name 01/08/20 1628             Time Calculation    Start Time  1545  -SC      Stop Time  1555  -SC      Time Calculation (min)  10 min  -SC      PT Received On  01/08/20  -SC      PT - Next Appointment  01/10/20  -SC         Time Calculation- PT    Total Timed Code Minutes- PT  10 minute(s)  -SC         Timed Charges    40415 - PT Therapeutic Activity Minutes  10  -SC        User Key  (r) = Recorded By, (t) = Taken By, (c) = Cosigned By    Initials Name Provider Type    Lanette Carroll PTA Physical Therapy Assistant        Therapy Charges for Today     Code Description Service Date Service Provider Modifiers Qty    99658379743 HC PT THERAPEUTIC ACT EA 15 MIN 1/8/2020 Lanette Montero PTA GP 1          PT G-Codes  Outcome Measure Options: AM-PAC 6 Clicks Basic Mobility (PT)  AM-PAC 6 Clicks Score (PT): 11    Lanette Montero PTA  1/8/2020

## 2020-01-08 NOTE — PROGRESS NOTES
[facility] Medicine Services Daily Progress Note      Hospitalist Team  LOS 1 days      Patient Care Team:  Jimy Lopez MD as PCP - General (Family Medicine)    Patient Location: 2123/1      Subjective   Subjective     Chief Complaint / Subjective  Chief Complaint   Patient presents with   • Chest Pain       Present on Admission:  • Chest pain  • Diabetes mellitus (CMS/HCC)  • Candidiasis  • Hyperlipemia  • Essential hypertension  • Peripheral vascular disease (CMS/HCC)  • GERD (gastroesophageal reflux disease)  • Coronary artery disease  • Benign prostatic disease  • Bacteremia due to Enterococcus  • Urinary tract infection associated with indwelling urethral catheter (CMS/HCC)  • 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 Pt wife continues to be very anxious regarding her 's medical treatment and conditions. Amenable to palliative care consult to discuss long-term goals of care.     Brief Synopsis of Hospital Course/HPI  Mr. Solano is a 74 y.o. white male, nursing home resident, with multiple medical problems, a history of coronary artery disease, PVD S/P R AKA,  Presented with chest pain .  Patient states the pain started this morning and has been constant, moderate.  The pain is in the center of his chest and radiates to his neck. Worse with deep inspiration, He has had associated shortness of breath and diaphoresis.  He denies any alleviating or exacerbating factors.  Pain is described as moderate in intensity. Found to have a temp 99.3.       Review of Systems   Constitution: Positive for malaise/fatigue. Negative for chills, decreased appetite, diaphoresis, fever, night sweats, weight gain and weight loss.   Cardiovascular: Positive for chest pain. Negative for claudication, cyanosis, dyspnea on exertion, irregular heartbeat,  "leg swelling, near-syncope, orthopnea, palpitations, paroxysmal nocturnal dyspnea and syncope.   Respiratory: Negative for cough, hemoptysis, shortness of breath, sleep disturbances due to breathing, snoring, sputum production and wheezing.          Objective   Objective      Vital Signs  Temp:  [97.4 °F (36.3 °C)-98.8 °F (37.1 °C)] 97.4 °F (36.3 °C)  Heart Rate:  [] 93  Resp:  [16-20] 16  BP: (111-141)/(57-86) 132/80  Oxygen Therapy  SpO2: 97 %  Pulse Oximetry Type: Intermittent  Device (Oxygen Therapy): room air  Flowsheet Rows      First Filed Value   Admission Height  180.3 cm (71\") Documented at 01/05/2020 1145   Admission Weight  70.3 kg (155 lb) Documented at 01/05/2020 1145        Intake & Output (last 3 days)       01/05 0701 - 01/06 0700 01/06 0701 - 01/07 0700 01/07 0701 - 01/08 0700    P.O.  420 480    I.V. (mL/kg) 250 (3.5)      IV Piggyback 1000  100    Total Intake(mL/kg) 1250 (17.6) 420 (5.9) 580 (8.2)    Urine (mL/kg/hr) 400 1850 (1.1)     Stool 0      Total Output 400 1850     Net +850 -1430 +580           Stool Unmeasured Occurrence 1 x          Lines, Drains & Airways    Active LDAs     Name:   Placement date:   Placement time:   Site:   Days:    PICC Single Lumen 01/05/20 Left   01/05/20    1155    --   1    Urethral Catheter   01/05/20 PRESENT UPON ARRIVAL     1157     1                  Physical Exam:    Physical Exam   Constitutional: He is oriented to person, place, and time. He appears well-developed and well-nourished. No distress.   HENT:   Head: Normocephalic and atraumatic.   Nose: Nose normal.   Mouth/Throat: No oropharyngeal exudate.   Eyes: Pupils are equal, round, and reactive to light. Conjunctivae are normal. Right eye exhibits no discharge. Left eye exhibits no discharge.   Neck: Normal range of motion. Neck supple. No tracheal deviation present.   Cardiovascular: Normal rate, regular rhythm and normal heart sounds. Exam reveals no gallop and no friction rub.   No murmur " heard.  Pulmonary/Chest: Effort normal and breath sounds normal. No stridor. No respiratory distress. He has no wheezes. He has no rales. He exhibits no tenderness.   Abdominal: Soft. Bowel sounds are normal. He exhibits no distension. There is no tenderness. There is no guarding.   Musculoskeletal: Normal range of motion. He exhibits no edema, tenderness or deformity.   Neurological: He is alert and oriented to person, place, and time. No cranial nerve deficit.   Skin: Skin is warm and dry. No rash noted. He is not diaphoretic. No erythema. No pallor.         Procedures:              Results Review:     I reviewed the patient's new clinical results.      Lab Results (last 24 hours)     Procedure Component Value Units Date/Time    POC Glucose Once [[id number]]  (Abnormal) Collected:  01/07/20 2054    Specimen:  Blood Updated:  01/07/20 2057     Glucose 328 mg/dL      Comment: Serial Number: [id number]:  [id number]       Blood Culture - Blood, Arm, Left [[id number]] Collected:  01/07/20 1833    Specimen:  Blood from Arm, Left Updated:  01/07/20 1842    Blood Culture - Blood, Arm, Left [[id number]] Collected:  01/07/20 1834    Specimen:  Blood from Arm, Left Updated:  01/07/20 1842    POC Glucose Once [[id number]]  (Abnormal) Collected:  01/07/20 1627    Specimen:  Blood Updated:  01/07/20 1631     Glucose 232 mg/dL      Comment: Serial Number: [id number]:  [id number]       Blood Culture - Blood, Hand, Left [[id number]]  (Abnormal) Collected:  01/05/20 1430    Specimen:  Blood from Hand, Left Updated:  01/07/20 1404     Blood Culture Enterococcus faecalis     Isolated from Aerobic and Anaerobic Bottles     Gram Stain Anaerobic Bottle Gram positive cocci in chains      Aerobic Bottle Gram positive cocci in chains    Narrative:       Refer to previous blood culture collected on  1/5/2020 1421 for MICs.    Blood Culture - Blood, Arm, Left [[id number]]  (Abnormal)  (Susceptibility) Collected:  01/05/20 1421     Specimen:  Blood from Arm, Left Updated:  01/07/20 1402     Blood Culture Enterococcus faecalis     Isolated from Aerobic and Anaerobic Bottles     Gram Stain Aerobic Bottle Gram positive cocci in chains      Anaerobic Bottle Gram positive cocci in chains    Susceptibility      Enterococcus faecalis     SUNG     Ampicillin Susceptible     Vancomycin Susceptible                    POC Glucose Once [[id number]]  (Abnormal) Collected:  01/07/20 1129    Specimen:  Blood Updated:  01/07/20 1143     Glucose 155 mg/dL      Comment: Serial Number: [id number]:  [id number]       POC Glucose Once [[id number]]  (Normal) Collected:  01/07/20 0702    Specimen:  Blood Updated:  01/07/20 0704     Glucose 96 mg/dL      Comment: Serial Number: [id number]:  [id number]       Basic Metabolic Panel [[id number]]  (Abnormal) Collected:  01/07/20 0603    Specimen:  Blood Updated:  01/07/20 0703     Glucose 114 mg/dL      BUN 27 mg/dL      Creatinine 0.60 mg/dL      Sodium 134 mmol/L      Potassium 4.0 mmol/L      Chloride 98 mmol/L      CO2 27.0 mmol/L      Calcium 9.5 mg/dL      eGFR Non African Amer 131 mL/min/1.73      BUN/Creatinine Ratio 45.0     Anion Gap 9.0 mmol/L     Narrative:       GFR Normal >60  Chronic Kidney Disease <60  Kidney Failure <15      CK [[id number]]  (Normal) Collected:  01/07/20 0603    Specimen:  Blood Updated:  01/07/20 0700     Creatine Kinase 79 U/L     CBC & Differential [[id number]] Collected:  01/07/20 0603    Specimen:  Blood Updated:  01/07/20 0638    Narrative:       The following orders were created for panel order CBC & Differential.  Procedure                               Abnormality         Status                     ---------                               -----------         ------                     CBC Auto Differential[[id number]]        Abnormal            Final result                 Please view results for these tests on the individual orders.    CBC Auto Differential [[id number]]   (Abnormal) Collected:  01/07/20 0603    Specimen:  Blood Updated:  01/07/20 0638     WBC 12.20 10*3/mm3      RBC 3.72 10*6/mm3      Hemoglobin 9.6 g/dL      Hematocrit 29.8 %      MCV 80.1 fL      MCH 25.8 pg      MCHC 32.2 g/dL      RDW 20.7 %      RDW-SD 58.2 fl      MPV 6.6 fL      Platelets 395 10*3/mm3      Neutrophil % 81.6 %      Lymphocyte % 8.1 %      Monocyte % 9.0 %      Eosinophil % 0.6 %      Basophil % 0.7 %      Neutrophils, Absolute 10.00 10*3/mm3      Lymphocytes, Absolute 1.00 10*3/mm3      Monocytes, Absolute 1.10 10*3/mm3      Eosinophils, Absolute 0.10 10*3/mm3      Basophils, Absolute 0.10 10*3/mm3      nRBC 0.1 /100 WBC     Blood Culture - Blood, Blood, Venous Line [[id number]]  (Abnormal) Collected:  01/06/20 1011    Specimen:  Blood, Venous Line Updated:  01/07/20 0328     Blood Culture Abnormal Stain     Gram Stain Anaerobic Bottle Gram positive cocci in chains      Aerobic Bottle Gram positive cocci in chains    Blood Culture ID, PCR - Blood, Blood, Venous Line [[id number]]  (Abnormal) Collected:  01/06/20 1011    Specimen:  Blood, Venous Line Updated:  01/07/20 0328     BCID, PCR Enterococcus spp. Identification by BCID PCR.    Urine Culture - Urine, Urine, Catheter [[id number]]  (Abnormal)  (Susceptibility) Collected:  01/05/20 1436    Specimen:  Urine, Catheter Updated:  01/07/20 0125     Urine Culture >100,000 CFU/mL Escherichia coli    Susceptibility      Escherichia coli     SUNG     Ampicillin Resistant     Ampicillin + Sulbactam Intermediate     Cefazolin Susceptible     Cefepime Susceptible     Ceftazidime Susceptible     Ceftriaxone Susceptible     Gentamicin Resistant     Levofloxacin Intermediate     Nitrofurantoin Susceptible     Piperacillin + Tazobactam Susceptible     Tetracycline Resistant     Trimethoprim + Sulfamethoxazole Resistant                        No results found for: HGBA1C  Results from last 7 days   Lab Units 01/05/20  1227   INR  1.03               Microbiology  Results (last 10 days)     Procedure Component Value - Date/Time    Blood Culture - Blood, Blood, Venous Line [[id number]]  (Abnormal) Collected:  01/06/20 1011    Lab Status:  Preliminary result Specimen:  Blood, Venous Line Updated:  01/07/20 0328     Blood Culture Abnormal Stain     Gram Stain Anaerobic Bottle Gram positive cocci in chains      Aerobic Bottle Gram positive cocci in chains    Blood Culture ID, PCR - Blood, Blood, Venous Line [[id number]]  (Abnormal) Collected:  01/06/20 1011    Lab Status:  Final result Specimen:  Blood, Venous Line Updated:  01/07/20 0328     BCID, PCR Enterococcus spp. Identification by BCID PCR.    Urine Culture - Urine, Urine, Catheter [[id number]]  (Abnormal)  (Susceptibility) Collected:  01/05/20 1436    Lab Status:  Final result Specimen:  Urine, Catheter Updated:  01/07/20 0125     Urine Culture >100,000 CFU/mL Escherichia coli    Susceptibility      Escherichia coli     SUNG     Ampicillin Resistant     Ampicillin + Sulbactam Intermediate     Cefazolin Susceptible     Cefepime Susceptible     Ceftazidime Susceptible     Ceftriaxone Susceptible     Gentamicin Resistant     Levofloxacin Intermediate     Nitrofurantoin Susceptible     Piperacillin + Tazobactam Susceptible     Tetracycline Resistant     Trimethoprim + Sulfamethoxazole Resistant                    Blood Culture - Blood, Hand, Left [[id number]]  (Abnormal) Collected:  01/05/20 1430    Lab Status:  Final result Specimen:  Blood from Hand, Left Updated:  01/07/20 1404     Blood Culture Enterococcus faecalis     Isolated from Aerobic and Anaerobic Bottles     Gram Stain Anaerobic Bottle Gram positive cocci in chains      Aerobic Bottle Gram positive cocci in chains    Narrative:       Refer to previous blood culture collected on  1/5/2020 1421 for MICs.    Influenza Antigen, Rapid - Swab, Nasopharynx [[id number]]  (Normal) Collected:  01/05/20 1422    Lab Status:  Final result Specimen:  Swab from Nasopharynx Updated:   01/05/20 1445     Influenza A PCR Not Detected     Influenza B PCR Not Detected    Blood Culture - Blood, Arm, Left [[id number]]  (Abnormal)  (Susceptibility) Collected:  01/05/20 1421    Lab Status:  Final result Specimen:  Blood from Arm, Left Updated:  01/07/20 1402     Blood Culture Enterococcus faecalis     Isolated from Aerobic and Anaerobic Bottles     Gram Stain Aerobic Bottle Gram positive cocci in chains      Anaerobic Bottle Gram positive cocci in chains    Susceptibility      Enterococcus faecalis     SUNG     Ampicillin Susceptible     Vancomycin Susceptible                    Blood Culture ID, PCR - Blood, Arm, Left [[id number]]  (Abnormal) Collected:  01/05/20 1421    Lab Status:  Final result Specimen:  Blood from Arm, Left Updated:  01/06/20 0618     BCID, PCR Enterococcus spp. Identification by BCID PCR.     BOTTLE TYPE Aerobic Bottle          ECG/EMG Results (most recent)     Procedure Component Value Units Date/Time    Transthoracic Echo Complete With Contrast if Necessary Per Protocol [[id number]] Collected:  01/06/20 1255     Updated:  01/06/20 1535     BSA 1.9 m^2      BH CV ECHO ANGELIA - RVDD 2.7 cm      IVSd 1.0 cm      LVIDd 5.9 cm      LVIDs 5.6 cm      LVPWd 1.4 cm      IVS/LVPW 0.75     FS 4.7 %      EDV(Teich) 174.6 ml      ESV(Teich) 156.3 ml      EF(Teich) 10.5 %      EDV(cubed) 207.6 ml      ESV(cubed) 179.6 ml      EF(cubed) 13.5 %      LV mass(C)d 306.7 grams      LV mass(C)dI 161.7 grams/m^2      SV(Teich) 18.3 ml      SI(Teich) 9.6 ml/m^2      SV(cubed) 28.0 ml      SI(cubed) 14.7 ml/m^2      Ao root diam 3.4 cm      Ao root area 9.2 cm^2      asc Aorta Diam 3.0 cm      LVOT diam 2.2 cm      LVOT area 3.8 cm^2      RVOT diam 2.6 cm      RVOT area 5.3 cm^2      EDV(MOD-sp4) 125.9 ml      ESV(MOD-sp4) 86.6 ml      EF(MOD-sp4) 31.2 %      SV(MOD-sp4) 39.3 ml      SI(MOD-sp4) 20.7 ml/m^2      Ao root area (BSA corrected) 1.8     LV Humphries Vol (BSA corrected) 66.3 ml/m^2      LV Sys Vol (BSA  corrected) 45.6 ml/m^2      Aortic R-R 0.5 sec      Aortic .4 BPM      MV E max miguelito 48.2 cm/sec      MV A max miguelito 99.7 cm/sec      MV E/A 0.48     MV V2 max 116.2 cm/sec      MV max PG 5.4 mmHg      MV V2 mean 74.0 cm/sec      MV mean PG 2.5 mmHg      MV V2 VTI 16.5 cm      MVA(VTI) 3.1 cm^2      MV dec slope 1,140 cm/sec^2      MV dec time 0.04 sec      Ao pk miguelito 184.8 cm/sec      Ao max PG 13.7 mmHg      Ao max PG (full) 10.3 mmHg      Ao V2 mean 112.7 cm/sec      Ao mean PG 6.3 mmHg      Ao mean PG (full) 5.0 mmHg      Ao V2 VTI 22.3 cm      SUSY(I,A) 2.3 cm^2      SUSY(I,D) 2.3 cm^2      SUSY(V,A) 1.9 cm^2      SUSY(V,D) 1.9 cm^2      LV V1 max PG 3.4 mmHg      LV V1 mean PG 1.3 mmHg      LV V1 max 91.7 cm/sec      LV V1 mean 49.7 cm/sec      LV V1 VTI 13.6 cm      MR max miguelito 456.8 cm/sec      MR max PG 83.5 mmHg      CO(Ao) 24.6 l/min      CI(Ao) 13.0 l/min/m^2      SV(Ao) 206.0 ml      SI(Ao) 108.6 ml/m^2      CO(LVOT) 6.1 l/min      CI(LVOT) 3.2 l/min/m^2      SV(LVOT) 51.5 ml      SV(RVOT) 48.4 ml      SI(LVOT) 27.1 ml/m^2      PA V2 max 77.5 cm/sec      PA max PG 2.4 mmHg      PA max PG (full) 0.42 mmHg      PA V2 mean 50.0 cm/sec      PA mean PG 1.2 mmHg      PA mean PG (full) 0.35 mmHg      PA V2 VTI 11.6 cm      PVA(I,A) 4.2 cm^2      BH CV ECHO ANGELIA - PVA(I,D) 4.2 cm^2      BH CV ECHO ANGELIA - PVA(V,A) 4.8 cm^2      BH CV ECHO ANGELIA - PVA(V,D) 4.8 cm^2      PA acc time 0.08 sec      RV V1 max PG 2.0 mmHg      RV V1 mean PG 0.86 mmHg      RV V1 max 70.4 cm/sec      RV V1 mean 40.6 cm/sec      RV V1 VTI 9.1 cm      TR max miguelito 167.4 cm/sec      RVSP(TR) 14.2 mmHg      RAP systole 3.0 mmHg      PA pr(Accel) 44.7 mmHg      Qp/Qs 0.94      CV ECHO ANGELIA - BZI_BMI 21.8 kilograms/m^2       CV ECHO ANGELIA - BSA(HAYCOCK) 1.9 m^2       CV ECHO ANGELIA - BZI_METRIC_WEIGHT 70.8 kg       CV ECHO ANGELIA - BZI_METRIC_HEIGHT 180.3 cm      EF(MOD-bp) 31.0 %      LA dimension(2D) 4.7 cm      Echo EF Estimated 35 %      Narrative:       · The left ventricular cavity is mild-to-moderately dilated.  · Left ventricular wall thickness is consistent with mild concentric   hypertrophy.  · The following left ventricular wall segments are hypokinetic: mid   anterior, apical anterior, apical septal, mid inferoseptal, apex   hypokinetic, mid anteroseptal and basal anterior. The following left   ventricular wall segments are akinetic: basal anterolateral, mid   anterolateral, apical lateral, basal inferolateral, mid inferolateral,   apical inferior, basal inferior, mid inferior and basal inferoseptal.  · There is a small (<1cm) pericardial effusion.  · Estimated EF = 35%.  · Left ventricular diastolic dysfunction (grade I) consistent with   impaired relaxation.  · Left atrial cavity size is mild-to-moderately dilated.  · Mild mitral valve regurgitation is present  · Mild tricuspid valve regurgitation is present.       ECG 12 Lead [[id number]] Collected:  01/05/20 1150     Updated:  01/06/20 1622    Narrative:       HEART RATE= 118  bpm  RR Interval= 508  ms  NE Interval= 150  ms  P Horizontal Axis= -10  deg  P Front Axis= 64  deg  QRSD Interval= 109  ms  QT Interval= 350  ms  QRS Axis= 22  deg  T Wave Axis= -70  deg  - ABNORMAL ECG -  Sinus tachycardia  Incomplete left bundle branch block  Inferior infarct, age indeterminate  No previous ECG available for comparison  Electronically Signed By: Mickey Colon (МАРИНА) 06-Jan-2020 16:22:08  Date and Time of Study: 2020-01-05 11:50:25               Results for orders placed during the hospital encounter of 01/05/20   Transthoracic Echo Complete With Contrast if Necessary Per Protocol    Narrative · The left ventricular cavity is mild-to-moderately dilated.  · Left ventricular wall thickness is consistent with mild concentric   hypertrophy.  · The following left ventricular wall segments are hypokinetic: mid   anterior, apical anterior, apical septal, mid inferoseptal, apex   hypokinetic, mid  anteroseptal and basal anterior. The following left   ventricular wall segments are akinetic: basal anterolateral, mid   anterolateral, apical lateral, basal inferolateral, mid inferolateral,   apical inferior, basal inferior, mid inferior and basal inferoseptal.  · There is a small (<1cm) pericardial effusion.  · Estimated EF = 35%.  · Left ventricular diastolic dysfunction (grade I) consistent with   impaired relaxation.  · Left atrial cavity size is mild-to-moderately dilated.  · Mild mitral valve regurgitation is present  · Mild tricuspid valve regurgitation is present.          Ct Abdomen Pelvis With & Without Contrast    Result Date: 1/6/2020   1.  Left-sided ureteral stent appropriately positioned.  No evidence of hydronephrosis.  No definite renal or ureteral stone identified. 2.  Status post distal aorto bifemoral bypass graft placement.  The graft appears to be completely occluded just below the level of the renal arteries.  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  Portion of the left common femoral artery appears to be reconstituted by multiple small collaterals.  The right common femoral artery does not appear to be reconstituted. 3.  Moderate-sized left ventricular aneurysm 4.  Severe muscular atrophy of the pelvis and upper thighs 5.  Bilateral pleural effusions and bilateral lower lobe airspace disease 6.  Small pericardial effusion.  Electronically Signed By-Ted Roberts On:1/6/2020 11:18 PM This report was finalized on [id number] by  Ted Roberts, .    Xr Chest 1 View    Result Date: 1/5/2020   1. Low lung volumes. 2. No active pulmonary disease.  Electronically Signed By-John Chavira On:1/5/2020 1:10 PM This report was finalized on [id number] by  John Chavira, .      Xrays, labs reviewed personally by physician.    Medication Review:   I have reviewed the patient's current medication list      Scheduled Meds    aspirin 325 mg Oral Daily      atorvastatin 40 mg Oral Daily   DAPTOmycin 6 mg/kg Intravenous Q24H   gabapentin 100 mg Oral BID   hydrocortisone 1 application Topical Q12H   insulin glargine 10 Units Subcutaneous Nightly   insulin lispro 0-9 Units Subcutaneous 4x Daily With Meals & Nightly   metoprolol tartrate 50 mg Oral TID   pantoprazole 40 mg Oral QAM   piperacillin-tazobactam 3.375 g Intravenous Q8H   sodium chloride 10 mL Intravenous Q12H   tamsulosin 0.4 mg Oral Nightly       Meds Infusions       Meds PRN  dextrose  •  dextrose  •  docusate sodium  •  glucagon (human recombinant)  •  HYDROcodone-acetaminophen  •  insulin lispro **AND** insulin lispro  •  ipratropium-albuterol  •  ketamine (KETALAR) infusion **AND** Ketamine Vital Signs & Assessment  •  nitroglycerin  •  ondansetron **OR** ondansetron  •  [COMPLETED] Insert peripheral IV **AND** sodium chloride  •  sodium chloride      Assessment/Plan   Assessment/Plan     Active Hospital Problems    Diagnosis  POA   • Bacteremia due to Enterococcus [R78.81, B95.2]  Yes     Priority: High   • Urinary tract infection associated with indwelling urethral catheter (CMS/HCC) [T83.511A, N39.0]  Yes     Priority: High   • Peripheral vascular disease (CMS/[facility]) [I73.9]  Yes     Priority: High   • Vascular device, implant, or graft infection or inflammation (CMS/[facility]) [T82.7XXA]  Yes     Priority: High   • Chest pain [R07.9]  Yes     Priority: Medium   • Diabetes mellitus (CMS/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) [I70.244]  Yes     Priority: Medium   • Hyperlipemia [E78.5]  Yes     Priority: Low   • GERD (gastroesophageal reflux disease) [K21.9]  Yes     Priority: Low   • Coronary artery disease [I25.10]  Yes     Priority: Low   • Benign prostatic disease [N42.9]  Yes     Priority: Low      Resolved Hospital Problems    No resolved problems to display.     Sepsis present on admission- Resolving. Patient with enterococcus bacteremia, and UTI with indwelling martinez catheter. Source of bacteremia abdominal versus femoral graft site infection, favoring the later. Patient's wife refused vascular surgery consultation today. Will need vascular surgery to evaluate patient in order to facilitate transfer to [place] bc an inpatient need must be demonstrated prior to transfer. Will discuss with patient, wife and case management in AM. Pt's wife agreeable to palliative care c/s to discuss long-term goals for the patient, would benefit immensely from this.   -ID c/s, appreciate recs   -MIKKI ordered to r/o endocarditis  -IV abx per ID   -Vascular surgery c/s, appreciate recs   -has a picc dec/6/2019>on eraxis  -Will need long-term IV abx on discharge   -Palliative care c/s, appreciate recs   -Possible transfer to [place] for vascular surgery may be indicated, however will not transfer without demonstrable need for medical necessity     S/p multiple vascular graft repairs/surgeries on RLE   -Currently remains without overt signs or sx of infection, however remains possible source of persistent infection   -Will need vascular input on further surgical vs conservative management      Chest pain/Hx of CAD- Medical management per cardiology, appreciate involvement   -Troponin uptrending w/ significant cardiac hx (recent cath 11/2019 in [place])  -Cardiology c/s, appreciate recs     DM2  -continue long actin insulin  -RISS  -hold po meds     HTN-bp 103/67  -hold metoprolol, losartan     HLD  -continue lipitor  -check lipid p     Candidemia  -treatment per ID      VTE Prophylaxis - SCDs.      Code Status -   Code Status and Medical Interventions:   Ordered at: 01/1944     Level Of Support Discussed With:    Patient     Code Status:    CPR     Medical Interventions (Level of Support Prior to Arrest):    Full       Discharge Planning    Destination       Coordination has not been started for this encounter.      Durable Medical Equipment      Coordination has not been started for this encounter.      Dialysis/Infusion      Coordination has not been started for this encounter.      Home Medical Care      Coordination has not been started for this encounter.      Therapy      Coordination has not been started for this encounter.      Community Resources      Coordination has not been started for this encounter.            Electronically signed by Radha Swanson MD, 01/07/20, 9:16 PM.  [facility] Hospitalist Team

## 2020-01-08 NOTE — ANESTHESIA PREPROCEDURE EVALUATION
Anesthesia Evaluation     Patient summary reviewed and Nursing notes reviewed   NPO Solid Status: > 8 hours  NPO Liquid Status: > 8 hours           Airway   Mallampati: II  TM distance: >3 FB  Neck ROM: full  No difficulty expected  Dental - normal exam     Pulmonary - negative pulmonary ROS and normal exam   Cardiovascular - normal exam    (+) hypertension, CAD, angina, PVD, hyperlipidemia,       Neuro/Psych  (+) CVA,     GI/Hepatic/Renal/Endo    (+)  GERD,  diabetes mellitus,     Musculoskeletal (-) negative ROS    Abdominal  - normal exam    Bowel sounds: normal.   Substance History - negative use     OB/GYN negative ob/gyn ROS         Other                        Anesthesia Plan    ASA 4     MAC     intravenous induction     Anesthetic plan, all risks, benefits, and alternatives have been provided, discussed and informed consent has been obtained with: patient.

## 2020-01-08 NOTE — DISCHARGE SUMMARY
[facility] Medicine Services  DISCHARGE SUMMARY        Prepared For PCP:  Jimy Lopez MD    Patient Name: Artur Solano  : 1945  MRN: [id number]      Date of Admission:   2020    Date of Discharge:  2020    Length of stay:  LOS: 2 days     Hospital Course     Presenting Problem:   Leukocytosis, unspecified type [D72.829]  Chest pain, unspecified type [R07.9]  Sepsis, due to unspecified organism, unspecified whether acute organ dysfunction present (CMS/[facility]) [A41.9]  Chest pain, unspecified type [R07.9]      Active Hospital Problems    Diagnosis  POA   • Bacteremia due to Enterococcus [R78.81, B95.2]  Yes     Priority: High   • Urinary tract infection associated with indwelling urethral catheter (CMS/[facility]) [T83.511A, N39.0]  Yes     Priority: High   • Peripheral vascular disease (CMS/[facility]) [I73.9]  Yes     Priority: High   • Vascular device, implant, or graft infection or inflammation (CMS/[facility]) [T82.7XXA]  Yes     Priority: High   • Chest pain [R07.9]  Yes     Priority: Medium   • Diabetes mellitus (CMS/[facility]) [E11.9]  Yes     Priority: Medium   • Candidiasis [B37.9]  Yes     Priority: Medium   • Essential hypertension [I10]  Yes     Priority: Medium   • 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       Active Hospital Problems:  No notes have been filed under this hospital service.  Service: Hospitalist      Resolved Hospital Problems:  No notes have been filed under this hospital service.  Service: Hospitalist        Hospital Course:  Artur Solano is a 74 y.o. male with complicated past medical history  including 3 recent vascular surgeries on right lower extremity status post AKA, treated for graft infection previously.  Per Dr. 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 He has undergone multiple debridements which have grown Candida glabrata. CT showed aneurysmal degeneration of the previous vein bypass graft in the groin and fluid around the distal limb of the graft. He required debridement and excision of infected prosthetic graft.    Patient was admitted here to [facility] initially for chest pain, was evaluated by cardiology here, who recommended medical management for his NSTEMI due to recent cath.  Also found to be septic on admission with elevated white blood cell count of 21, no hypotension, initially elevated lactate.  Patient was started on broad-spectrum antibiotics initially with vancomycin and cefepime, sepsis was felt to be secondary to UTI due to indwelling Cedeno catheter.  Urology was consulted, and a CT abdomen pelvis was rather unremarkable and no surgical treatment was indicated.  Blood cultures which were obtained initially on admission, were found to be growing enterococcus.  ID was subsequently consulted and patient's antibiotics were changed to daptomycin and Zosyn, cefepime was discontinued as it was felt to be contributing to the patient's confusion.  Infectious disease also recommended a vascular surgery consult, as patient's complicated surgeries along with graft infection may be a continued source of persistent bacteremia.  Vascular surgery, Dr. Mata, recommended transfer to [place], as patient had previously undergone extensive surgeries at that facility and continues to have a possible nidus for infection.  Discussed at length with patient and his wife at bedside in regards to the transfer, and they were amenable.  Palliative care was also consulted prior to  discharge as patient has been overall declining over the past few months, however patient's wife continues to desire aggressive measures and patient is in agreement with his wife although he has expressed hesitations in regards to further surgeries.  A MIKKI was completed on 1/8/2020 due to concern for underlying endocarditis as requested by ID prior to transfer to [place].    Recommendation for Outpatient Providers:             Reasons For Change In Medications and Indications for New Medications:        Day of Discharge     HPI:   Patient complaining of some pain in his right lower extremity    Vital Signs:   Temp:  [97.6 °F (36.4 °C)-98.3 °F (36.8 °C)] 98 °F (36.7 °C)  Heart Rate:  [75-87] 75  Resp:  [16-20] 18  BP: ()/(48-68) 97/50     Physical Exam:  Physical Exam   Unchanged from previously documented exam by hospitalist service    Pertinent  and/or Most Recent Results     Results from last 7 days   Lab Units 01/08/20  0442 01/07/20  0603 01/06/20  0613 01/06/20  0606 01/05/20  1227   WBC 10*3/mm3 8.70 12.20*  --  14.00* 21.10*   HEMOGLOBIN g/dL 8.8* 9.6*  --  9.7* 10.9*   HEMATOCRIT % 27.1* 29.8*  --  29.7* 34.8*   PLATELETS 10*3/mm3 359 395  --  468* 487*   SODIUM mmol/L 135* 134* 130*  --  134*   POTASSIUM mmol/L 4.2 4.0 5.2  --  4.4   CHLORIDE mmol/L 101 98 95*  --  99   CO2 mmol/L 26.0 27.0 21.0*  --  20.0*   BUN mg/dL 27* 27* 37*  --  20   CREATININE mg/dL 0.59* 0.60* 0.73*  --  0.67*   GLUCOSE mg/dL 231* 114* 380*  --  377*   CALCIUM mg/dL 9.1 9.5 9.2  --  9.7     Results from last 7 days   Lab Units 01/05/20  1227   BILIRUBIN mg/dL 0.2   ALK PHOS U/L 190*   ALT (SGPT) U/L 48*   AST (SGOT) U/L 56*   PROTIME Seconds 10.8   INR  1.03   APTT seconds 20.5*     Results from last 7 days   Lab Units 01/06/20  0613   CHOLESTEROL mg/dL 121   TRIGLYCERIDES mg/dL 78   HDL CHOL mg/dL 34*     Results from last 7 days   Lab Units 01/06/20  0613 01/05/20  2051 01/05/20  1742 01/05/20  1421 01/05/20  1227   TSH  uIU/mL 3.990  --   --   --   --    PROBNP pg/mL  --  810.6  --   --  1,272.0   TROPONIN T ng/mL 0.463* 0.296*  --   --  0.039*   LACTATE mmol/L  --   --  3.3* 3.1*  --        Brief Urine Lab Results  (Last result in the past 365 days)      Color   Clarity   Blood   Leuk Est   Nitrite   Protein   CREAT   Urine HCG        01/05/20 1436 Dark Yellow  Comment:  Result checked  Turbid  Comment:  Result checked  Large (3+) Large (3+) Positive >=300 mg/dL (3+)               Microbiology Results Abnormal     Procedure Component Value - Date/Time    Blood Culture - Blood, Blood, Venous Line [[id number]]  (Abnormal) Collected:  01/06/20 1011    Lab Status:  Preliminary result Specimen:  Blood, Venous Line Updated:  01/08/20 0556     Blood Culture Enterococcus species     Isolated from Aerobic and Anaerobic Bottles     Gram Stain Anaerobic Bottle Gram positive cocci in chains      Aerobic Bottle Gram positive cocci in chains    Blood Culture - Blood, Hand, Left [[id number]]  (Abnormal) Collected:  01/05/20 1430    Lab Status:  Final result Specimen:  Blood from Hand, Left Updated:  01/07/20 1404     Blood Culture Enterococcus faecalis     Isolated from Aerobic and Anaerobic Bottles     Gram Stain Anaerobic Bottle Gram positive cocci in chains      Aerobic Bottle Gram positive cocci in chains    Narrative:       Refer to previous blood culture collected on  1/5/2020 1421 for MICs.    Blood Culture - Blood, Arm, Left [[id number]]  (Abnormal)  (Susceptibility) Collected:  01/05/20 1421    Lab Status:  Final result Specimen:  Blood from Arm, Left Updated:  01/07/20 1402     Blood Culture Enterococcus faecalis     Isolated from Aerobic and Anaerobic Bottles     Gram Stain Aerobic Bottle Gram positive cocci in chains      Anaerobic Bottle Gram positive cocci in chains    Susceptibility      Enterococcus faecalis     SUNG     Ampicillin Susceptible     Vancomycin Susceptible                    Blood Culture ID, PCR - Blood, Blood, Venous  Line [[id number]]  (Abnormal) Collected:  01/06/20 1011    Lab Status:  Final result Specimen:  Blood, Venous Line Updated:  01/07/20 0328     BCID, PCR Enterococcus spp. Identification by BCID PCR.    Urine Culture - Urine, Urine, Catheter [[id number]]  (Abnormal)  (Susceptibility) Collected:  01/05/20 1436    Lab Status:  Final result Specimen:  Urine, Catheter Updated:  01/07/20 0125     Urine Culture >100,000 CFU/mL Escherichia coli    Susceptibility      Escherichia coli     SUNG     Ampicillin Resistant     Ampicillin + Sulbactam Intermediate     Cefazolin Susceptible     Cefepime Susceptible     Ceftazidime Susceptible     Ceftriaxone Susceptible     Gentamicin Resistant     Levofloxacin Intermediate     Nitrofurantoin Susceptible     Piperacillin + Tazobactam Susceptible     Tetracycline Resistant     Trimethoprim + Sulfamethoxazole Resistant                    Blood Culture ID, PCR - Blood, Arm, Left [[id number]]  (Abnormal) Collected:  01/05/20 1421    Lab Status:  Final result Specimen:  Blood from Arm, Left Updated:  01/06/20 0618     BCID, PCR Enterococcus spp. Identification by BCID PCR.     BOTTLE TYPE Aerobic Bottle    Influenza Antigen, Rapid - Swab, Nasopharynx [[id number]]  (Normal) Collected:  01/05/20 1422    Lab Status:  Final result Specimen:  Swab from Nasopharynx Updated:  01/05/20 1445     Influenza A PCR Not Detected     Influenza B PCR Not Detected          Ct Abdomen Pelvis With & Without Contrast    Result Date: 1/6/2020  Impression:  1.  Left-sided ureteral stent appropriately positioned.  No evidence of hydronephrosis.  No definite renal or ureteral stone identified. 2.  Status post distal aorto bifemoral bypass graft placement.  The graft appears to be completely occluded just below the level of the renal arteries.  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  Portion of the left common femoral artery appears to be reconstituted by multiple  small collaterals.  The right common femoral artery does not appear to be reconstituted. 3.  Moderate-sized left ventricular aneurysm 4.  Severe muscular atrophy of the pelvis and upper thighs 5.  Bilateral pleural effusions and bilateral lower lobe airspace disease 6.  Small pericardial effusion.  Electronically Signed By-Ted Roberts On:1/6/2020 11:18 PM This report was finalized on [id number] by  Ted Roberts, .    Xr Chest 1 View    Result Date: 1/5/2020  Impression:  1. Low lung volumes. 2. No active pulmonary disease.  Electronically Signed By-John Chavira On:1/5/2020 1:10 PM This report was finalized on [id number] by  John Chavira, .                Results for orders placed during the hospital encounter of 01/05/20   Transthoracic Echo Complete With Contrast if Necessary Per Protocol    Narrative · The left ventricular cavity is mild-to-moderately dilated.  · Left ventricular wall thickness is consistent with mild concentric   hypertrophy.  · The following left ventricular wall segments are hypokinetic: mid   anterior, apical anterior, apical septal, mid inferoseptal, apex   hypokinetic, mid anteroseptal and basal anterior. The following left   ventricular wall segments are akinetic: basal anterolateral, mid   anterolateral, apical lateral, basal inferolateral, mid inferolateral,   apical inferior, basal inferior, mid inferior and basal inferoseptal.  · There is a small (<1cm) pericardial effusion.  · Estimated EF = 35%.  · Left ventricular diastolic dysfunction (grade I) consistent with   impaired relaxation.  · Left atrial cavity size is mild-to-moderately dilated.  · Mild mitral valve regurgitation is present  · Mild tricuspid valve regurgitation is present.                  Test Results Pending at Discharge   Order Current Status    Blood Culture - Blood, Arm, Left In process    Blood Culture - Blood, Arm, Left In process    CBC & Differential In process    Catheter Culture - Cath Tip, Arm, Left In  process    Blood Culture - Blood, Blood, Venous Line Preliminary result            Procedures Performed           Consults:   Consults     Date and Time Order Name Status Description    1/7/2020 1248 Inpatient Vascular Surgery Consult Completed     1/6/2020 1234 Inpatient Urology Consult Completed     1/6/2020 0952 Inpatient Cardiology Consult Completed     1/5/2020 2018 Inpatient Infectious Diseases Consult Completed     1/5/2020 4109 Hospitalist (on-call MD unless specified) Completed             Discharge Details        Discharge Medications      ASK your doctor about these medications      Instructions Start Date   anidulafungin 100 MG injection  Commonly known as:  ERAXIS   100 mg, Intravenous, Nightly, 34 total days; last day Wednesday, 1/8/20      aspirin 325 MG tablet   325 mg, Oral, Daily      atorvastatin 40 MG tablet  Commonly known as:  LIPITOR   40 mg, Oral, Daily      AZO CRANBERRY 250-30 MG tablet   1 tablet, Oral, Daily PRN      famotidine 20 MG tablet  Commonly known as:  PEPCID   20 mg, Oral, 2 Times Daily PRN      gabapentin 100 MG capsule  Commonly known as:  NEURONTIN   100 mg, Oral, 2 Times Daily      glipizide 10 MG tablet  Commonly known as:  GLUCOTROL   10 mg, Oral, 2 Times Daily Before Meals      hydrocortisone 1 % ointment   1 application, Topical, 2 Times Daily PRN      insulin detemir 100 UNIT/ML injection  Commonly known as:  LEVEMIR   20 Units, Subcutaneous, Every Morning      ipratropium-albuterol 0.5-2.5 mg/3 ml nebulizer  Commonly known as:  DUO-NEB   3 mL, Nebulization, 3 Times Daily PRN      metFORMIN 1000 MG tablet  Commonly known as:  GLUCOPHAGE   1,000 mg, Oral, 2 Times Daily With Meals      metoprolol tartrate 50 MG tablet  Commonly known as:  LOPRESSOR   50 mg, Oral, 2 Times Daily      nitroglycerin 0.4 MG SL tablet  Commonly known as:  NITROSTAT   0.4 mg, Sublingual, Every 5 Minutes PRN, Take no more than 3 doses in 15 minutes.       SITagliptin 100 MG tablet  Commonly known  as:  JANUVIA   100 mg, Oral, Nightly      tamsulosin 0.4 MG capsule 24 hr capsule  Commonly known as:  FLOMAX   0.4 mg, Oral, Daily             Allergies   Allergen Reactions   • Keflex [Cephalexin] Unknown - High Severity     Unknown     • Lisinopril Unknown - High Severity     unknown         Discharge Disposition:      Diet:  Hospital:  Diet Order   Procedures   • Diet Diabetic/Consistent Carbs; Diabetic - Consistent Carb         Discharge Activity:         CODE STATUS:    Code Status and Medical Interventions:   Ordered at: 01/1945     Level Of Support Discussed With:    Patient     Code Status:    CPR     Medical Interventions (Level of Support Prior to Arrest):    Full         Follow-up Appointments  No future appointments.          Condition on Discharge:      Stable          Electronically signed by Radha Swanson MD, 01/08/20, 5:30 PM.    Time: I spent  60  minutes on this discharge activity which included face-to-face encounter with the patient/reviewing the data in the system/coordination of the care with the nursing staff as well as consultants/documentation/entering orders.

## 2020-01-08 NOTE — PROGRESS NOTES
Infectious Diseases Progress Note      LOS: 2 days   Patient Care Team:  Jimy Lopez MD as PCP - General (Family Medicine)    Chief Complaint: Feeling better today, less confusion    Subjective       The patient has been afebrile for the last 24 hours.  The patient is on room air, hemodynamically stable, and is tolerating antimicrobial therapy.    Review of Systems:   Review of Systems   Constitutional: Positive for fatigue.   HENT: Negative.    Respiratory: Positive for shortness of breath.         Improved   Cardiovascular: Positive for chest pain.        Improved   Gastrointestinal: Negative.    Musculoskeletal: Positive for arthralgias and back pain.   Skin: Positive for wound.   Neurological: Positive for weakness.   Psychiatric/Behavioral: Positive for confusion.        Confusion        Objective     Vital Signs  Temp:  [97.4 °F (36.3 °C)-98.3 °F (36.8 °C)] 97.6 °F (36.4 °C)  Heart Rate:  [81-97] 86  Resp:  [16-20] 18  BP: (102-132)/(48-80) 119/63    Physical Exam:  Physical Exam   Constitutional: He appears well-developed and well-nourished.   Chronically ill-appearing   HENT:   Head: Normocephalic and atraumatic.   Eyes: Pupils are equal, round, and reactive to light. Conjunctivae and EOM are normal.   Neck: Neck supple.   Cardiovascular: Normal rate, regular rhythm and normal heart sounds.   Pulmonary/Chest: Effort normal.   Diminished throughout   Abdominal: Soft. Bowel sounds are normal.   Genitourinary:   Genitourinary Comments: Cedeno catheter in place   Musculoskeletal:   Degenerative changes-right AKA   Neurological: He is alert.   Alert and oriented x2 but appears less confused today   Skin: Skin is warm and dry.   right above-the-knee stump but has an incision at the base of the stump that appears to be healing well.  There also is a healing incision in the right groin but does not show any overt signs of infection.  Patient also has 3 ulcers on his left foot-none of which appear to be grossly  infected.  Patient has very difficult pulses to palpate in his left foot.     Psychiatric: He has a normal mood and affect.   Vitals reviewed.       Results Review:    I have reviewed all clinical data, test, lab, and imaging results.     Radiology  No Radiology Exams Resulted Within Past 24 Hours    Cardiology    Laboratory  Results from last 7 days   Lab Units 01/08/20  0442   WBC 10*3/mm3 8.70   HEMOGLOBIN g/dL 8.8*   HEMATOCRIT % 27.1*   PLATELETS 10*3/mm3 359     Results from last 7 days   Lab Units 01/08/20  0442   SODIUM mmol/L 135*   POTASSIUM mmol/L 4.2   CHLORIDE mmol/L 101   CO2 mmol/L 26.0   BUN mg/dL 27*   CREATININE mg/dL 0.59*   GLUCOSE mg/dL 231*   CALCIUM mg/dL 9.1     Results from last 7 days   Lab Units 01/08/20  0442   SODIUM mmol/L 135*   POTASSIUM mmol/L 4.2   CHLORIDE mmol/L 101   CO2 mmol/L 26.0   BUN mg/dL 27*   CREATININE mg/dL 0.59*   GLUCOSE mg/dL 231*   CALCIUM mg/dL 9.1     Results from last 7 days   Lab Units 01/07/20  0603   CK TOTAL U/L 79             Microbiology   Microbiology Results (last 10 days)     Procedure Component Value - Date/Time    Blood Culture - Blood, Blood, Venous Line [[id number]]  (Abnormal) Collected:  01/06/20 1011    Lab Status:  Preliminary result Specimen:  Blood, Venous Line Updated:  01/08/20 0556     Blood Culture Enterococcus species     Isolated from Aerobic and Anaerobic Bottles     Gram Stain Anaerobic Bottle Gram positive cocci in chains      Aerobic Bottle Gram positive cocci in chains    Blood Culture ID, PCR - Blood, Blood, Venous Line [[id number]]  (Abnormal) Collected:  01/06/20 1011    Lab Status:  Final result Specimen:  Blood, Venous Line Updated:  01/07/20 0328     BCID, PCR Enterococcus spp. Identification by BCID PCR.    Urine Culture - Urine, Urine, Catheter [[id number]]  (Abnormal)  (Susceptibility) Collected:  01/05/20 1436    Lab Status:  Final result Specimen:  Urine, Catheter Updated:  01/07/20 0125     Urine Culture >100,000 CFU/mL  Escherichia coli    Susceptibility      Escherichia coli     SUNG     Ampicillin Resistant     Ampicillin + Sulbactam Intermediate     Cefazolin Susceptible     Cefepime Susceptible     Ceftazidime Susceptible     Ceftriaxone Susceptible     Gentamicin Resistant     Levofloxacin Intermediate     Nitrofurantoin Susceptible     Piperacillin + Tazobactam Susceptible     Tetracycline Resistant     Trimethoprim + Sulfamethoxazole Resistant                    Blood Culture - Blood, Hand, Left [[id number]]  (Abnormal) Collected:  01/05/20 1430    Lab Status:  Final result Specimen:  Blood from Hand, Left Updated:  01/07/20 1404     Blood Culture Enterococcus faecalis     Isolated from Aerobic and Anaerobic Bottles     Gram Stain Anaerobic Bottle Gram positive cocci in chains      Aerobic Bottle Gram positive cocci in chains    Narrative:       Refer to previous blood culture collected on  1/5/2020 1421 for MICs.    Influenza Antigen, Rapid - Swab, Nasopharynx [[id number]]  (Normal) Collected:  01/05/20 1422    Lab Status:  Final result Specimen:  Swab from Nasopharynx Updated:  01/05/20 1445     Influenza A PCR Not Detected     Influenza B PCR Not Detected    Blood Culture - Blood, Arm, Left [[id number]]  (Abnormal)  (Susceptibility) Collected:  01/05/20 1421    Lab Status:  Final result Specimen:  Blood from Arm, Left Updated:  01/07/20 1402     Blood Culture Enterococcus faecalis     Isolated from Aerobic and Anaerobic Bottles     Gram Stain Aerobic Bottle Gram positive cocci in chains      Anaerobic Bottle Gram positive cocci in chains    Susceptibility      Enterococcus faecalis     SUNG     Ampicillin Susceptible     Vancomycin Susceptible                    Blood Culture ID, PCR - Blood, Arm, Left [[id number]]  (Abnormal) Collected:  01/05/20 1421    Lab Status:  Final result Specimen:  Blood from Arm, Left Updated:  01/06/20 0618     BCID, PCR Enterococcus spp. Identification by BCID PCR.     BOTTLE TYPE Aerobic Bottle           Medication Review:       Schedule Meds    aspirin 325 mg Oral Daily   atorvastatin 40 mg Oral Daily   gabapentin 100 mg Oral BID   hydrocortisone 1 application Topical Q12H   insulin glargine 10 Units Subcutaneous Nightly   insulin lispro 0-9 Units Subcutaneous 4x Daily With Meals & Nightly   metoprolol tartrate 50 mg Oral TID   pantoprazole 40 mg Oral QAM   piperacillin-tazobactam 3.375 g Intravenous Q8H   sodium chloride 10 mL Intravenous Q12H   tamsulosin 0.4 mg Oral Nightly       Infusion Meds       PRN Meds  dextrose  •  dextrose  •  docusate sodium  •  glucagon (human recombinant)  •  HYDROcodone-acetaminophen  •  insulin lispro **AND** insulin lispro  •  ipratropium-albuterol  •  ketamine (KETALAR) infusion **AND** Ketamine Vital Signs & Assessment  •  nitroglycerin  •  ondansetron **OR** ondansetron  •  [COMPLETED] Insert peripheral IV **AND** sodium chloride  •  sodium chloride        Assessment/Plan       Antimicrobial Therapy   1.  IV daptomycin    Day 2  2.  IV Zosyn     Day 2  3.      Day  4.      Day  5.      Day      Assessment    Enterococcus faecalis bacteremia.  Most likely secondary to infected PICC line.  The patient presented to the hospital with left arm PICC line the patient was receiving IV micafungin.  However because of the persistent bacteremia over 48 hours were concerned about seeding to cardiac valve or abdominal graft.  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 Coli  -Patient has had a Cedeno catheter in due to a recent groin surgery     History of 3 recent surgeries on the patient's right stump due to infection with Candida  -Cultures from 11/8/2019 and 11/14/19 grew Candida glabrata-susceptible to Anidulafungin, Caspofungin, Micafungin, dose-dependent susceptibility to Diflucan with an SUNG of 2   -Patient then had a more formal I&D on 11/14/2019  -Heart catheterization on 11/2/2019  -Patient's last surgery  was on 11/26/2019- (excision of right limb of an atrial femoral bypass and repair of previous vein bypass )cultures from that surgery appear to be negative for any fungal infections and blood cultures from that timeframe are also are negative for any fungal infection  -However cultures did show growth of Staphylococcus epidermidis and Staphylococcus warneri-both methicillin resistant  -Patient was supposed to be on antifungal treatment until 1/7/2020 -patient received approximately 6 weeks of IV Anidulafungin 100mg qd    Patient's current wounds including an healing incision on the right stump and in the right groin-no overt signs of infection in either area    Patient has 3 ulcers on his left foot that do not show any signs of overt infection      Toxic metabolic encephalopathy present on admission was suspected to be as a result of using cefepime.  Improved     History of multiple vascular surgeries including repair of a aortic aneurysm   -Aortofemoral bypass in January 2005 for treatment of abdominal aortic, iliac, and left femoral aneurysm  -Left carotid enterectomy January 2011  -Right femoral to anterior tibial bypass with arm vein  -Right AKA on 8/4/2017 due to vascular graft occlusion     History of prostate cancer          Plan     Discontinue IV daptomycin   Continue IV Zosyn 3.375 g every 8 hours  Recommend 2 weeks of IV antibiotics to treat bacteremia if the MIKKI showed no vegetation otherwise longer duration is required.  The patient will need to be assessed by his vascular surgeon to make sure there is no infection at the intra-abdominal graft site.  Transesophageal echo.  Scheduled for later today  Remove PICC line and culture tip  Continue supportive care  Patient is supposed be transferred to [facility] later raquel Hopkins, APRN  01/08/20  11:24 AM     Note is dictated utilizing voice recognition software/Dragon

## 2020-01-08 NOTE — PLAN OF CARE
Pt underwent MIKKI today with Dr. Mendez. No Vegetation found. PICC pulled today and tip cultured. Pt to transfer to [facility] whenever bed is available.

## 2020-01-08 NOTE — ANESTHESIA POSTPROCEDURE EVALUATION
Patient: Artur Solano    Procedure Summary     Date:  01/08/20 Room / Location:  [facility]    Anesthesia Start:  1650 Anesthesia Stop:  1710    Procedure:  ADULT TRANSESOPHAGEAL ECHO (MIKKI) W/ CONT IF NECESSARY PER PROTOCOL Diagnosis:  (Endocarditis)    Scheduled Providers:   Provider:  Flavio Hope MD    Anesthesia Type:  MAC ASA Status:  4          Anesthesia Type: MAC    Vitals  No vitals data found for the desired time range.          Post Anesthesia Care and Evaluation    Patient location during evaluation: PACU  Patient participation: complete - patient participated  Level of consciousness: awake  Pain scale: See nurse's notes for pain score.  Pain management: adequate  Airway patency: patent  Anesthetic complications: No anesthetic complications  PONV Status: none  Cardiovascular status: acceptable  Respiratory status: acceptable  Hydration status: acceptable    Comments: Patient seen and examined postoperatively; vital signs stable; SpO2 greater than or equal to 90%; cardiopulmonary status stable; nausea/vomiting adequately controlled; pain adequately controlled; no apparent anesthesia complications; patient discharged from anesthesia care when discharge criteria were met

## 2020-01-08 NOTE — SIGNIFICANT NOTE
"Full Code / Aggressive Care    Palliative care  met with pt to assess for goals of care and conduct psychosocial assessment.  Pt spoke at length about his life and his wife's role in his care.  Pt reports that he trusts his wife to direct his care.  Pt reported that the last time he had to go through a code that, \"... They should have just let me go then.\"  I inquired about code status and reports that he does want to continue with aggressive measures and any other surgeries that may be necessary.  Discussed plans of possible transfer for closer consultation with existing physicians and pt was agreeable.  Pt reports his wife is DPOA, documents requested.  Emotional support provided.  Will continue to follow.  Thank you for the consult.       01/08/20 1615   PC Encounter Information   Palliative Care Patient? yes   Referral Date 01/07/20   Referral Time 1252   Date of Initial Encounter with a Palliative Care Provider 01/08/20   Time of initial encounter with a Palliative Care Provider 1130   Time Required for Initial Referral 30 mins   Additional Visit/Time Required to Achieve Results 30 mins   Patient Unit at Time of Referral PCU   Patient Unit Specialty at Time of Referral telemetry   Primary Diagnosis Leading to PC Consult infections/immunologic/HIV   Code Status at Time of Consult full   Palliative Care Team Members Involved in Consultation nurse practitioner;   Pain Assessment   Presence of Pain denies pain/discomfort   Screening Status/Interventions   Psychosocial Needs pos   Psychosocial Needs Intervened yes   Spiritual Needs unable   Goals of Care/ACP pos   Goals of Care/ACP Intervened yes   Palliative Care Acuity   Psychosocial Acuity 1   Health Care Directives/Treatment Preferences   Advance Directive Document on Chart at Time of Consult no   Pre-existing AND/MOST/POLST Order No   Advance Directive Status Patient has advance directive, copy requested   Type of Advance Directive " Durable power of  for health care;Health care directive/Living will   Surrogate Decision Maker addressed but unable to confirm   Goals of Care/Treatment Preferences (initial assessment and clinical changes) Offered/Attempted   Treatment Preferences full scope of treatment   Code Status Discussed yes   POLST/MOST Initiated no   Outcomes   Code Status After Consult full   Number of Family Meetings 1

## 2020-01-08 NOTE — PLAN OF CARE
Problem: Patient Care Overview  Goal: Plan of Care Review  Outcome: Ongoing (interventions implemented as appropriate)  Flowsheets (Taken 1/8/2020 4462)  Progress: improving  Plan of Care Reviewed With: patient; spouse  Outcome Summary: pt vinicio tx well.  pt making good effort to come to sit at [facility] today.  will attempt sliding board transfer next tx.  recommend pt return to IP rehab to recover his mobility before returning home with his wife

## 2020-01-08 NOTE — PROGRESS NOTES
Cardiology Progress  Note      Patient Care Team:  Jimy Lopez MD as PCP - General (Family Medicine)     Cardiology assessment and plan    Chest pain atypical  Coronary artery disease  Ischemic cardiomyopathy  Echocardiogram with estimated LV ejection fraction of 35%  EKG with sinus tachycardia incomplete left bundle branch block and ST-T wave changes in the inferolateral leads  Mild nonspecific elevation of troponin  Sepsis  Hypertension  Peripheral vascular disease  Hyperlipidemia  History of prostate cancer  Frail status with multiple comorbidities  Enterococcal bacteremia  Urinary tract infection with E. Coli  Multiple peripheral vascular surgeries  Recent cardiac catheterization at [place] with significant disease involving the PLB branch of the right coronary artery because of the small size of the vessel currently being medically managed  Persistent bacteremia with enterococcus     Test results and treatment options discussed with the patient and family  Cath report from [facility] that was done in  patient had 100% occlusion of the left circumflex artery which was a  there was a significant disease in the PLV branch which was felt to be small in size and was left for medical therapy  Discussed with the family and patient at bedside plan to manage conservatively at this time  Continue aspirin statin beta-blocker  Recommend DVT prophylaxis if there is no contraindication  Schedule for a MIKKI to rule out endocarditis  Discussed with ID at bedside  Possible transfer to [facility] system to be evaluated by vascular surgery for concern for possible endovascular infection  Risk benefits and alternatives discussed with the patient and family  Further recommendations based on patient hospital course              PATIENT IDENTIFICATION    Name: Artur Solano Age: 74 y.o. Sex: male :  1945 MRN: [id number]    REASON FOR FOLLOW-UP:  Atypical chest pain  Ischemic  cardiomyopathy with echocardiogram findings of EF 35%  Mild, nonspecific troponin elevation  Persistent bacteremia    SUBJECTIVE    Patient denies any further chest discomfort  Denies any shortness of breath  No pain reported      REVIEW OF SYSTEMS:  Pertinent items are noted in HPI, all other systems reviewed and negative  Patient denies any headache no nausea vomiting diarrhea no bruises no melena no abdominal pain no dizziness no syncope    OBJECTIVE   Lying in bed and appears comfortable  Patient with persistent enterococcal bacteremia    ASSESSMENT/PLAN    Chest pain    Diabetes mellitus (CMS/HCC)    Candidiasis    Hyperlipemia    Essential hypertension    Peripheral vascular disease (CMS/[facility])    GERD (gastroesophageal reflux disease)    Coronary artery disease    Benign prostatic disease    Bacteremia due to Enterococcus    Urinary tract infection associated with indwelling urethral catheter (CMS/[facility])    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 heel and midfoot (CMS/[facility])    Vascular device, implant, or graft infection or inflammation (CMS/[facility])    Chest pain atypical  Coronary artery disease  Ischemic cardiomyopathy  Echocardiogram with estimated LV ejection fraction of 35%  EKG with sinus tachycardia incomplete left bundle branch block and ST-T wave changes in the inferolateral leads  Mild nonspecific elevation of troponin  Sepsis  Hypertension  Peripheral vascular disease  Hyperlipidemia  History of prostate cancer  Frail status with multiple comorbidities  Enterococcal bacteremia  Urinary tract infection with E. Coli  Multiple peripheral vascular surgeries  Recent cardiac catheterization at [place] with significant disease involving the PLB branch of the right coronary artery because of the small size of the vessel currently being medically managed    Plan:  Medical records from recent hospitalization at [facility] reviewed by cardiologist.  No PCI performed during heart cath.  Patient with persistent  "enterococcal bacteremia.  Discussed with ID, MIKKI requested.  We will plan for MIKKI this afternoon per Dr. Mendez.  Discussed with patient and wife.  Questions answered.  Rhythm sinus  Further recommendations based on patient hospital course        Vital Signs  Visit Vitals  /68   Pulse 87   Temp 98.2 °F (36.8 °C) (Oral)   Resp 20   Ht 180.3 cm (71\")   Wt 71.3 kg (157 lb 3 oz)   SpO2 96%   BMI 21.92 kg/m²     Oxygen Therapy  SpO2: 96 %  Pulse Oximetry Type: Continuous  Device (Oxygen Therapy): nasal cannula  Flow (L/min): 5  Flowsheet Rows      First Filed Value   Admission Height  180.3 cm (71\") Documented at 01/05/2020 1145   Admission Weight  70.3 kg (155 lb) Documented at 01/05/2020 1145        Intake & Output (last 3 days)       01/05 0701 - 01/06 0700 01/06 0701 - 01/07 0700 01/07 0701 - 01/08 0700 01/08 0701 - 01/09 0700    P.O.  420 860     I.V. (mL/kg) 250 (3.5)  243 (3.4)     IV Piggyback 1000  100     Total Intake(mL/kg) 1250 (17.6) 420 (5.9) 1203 (16.9)     Urine (mL/kg/hr) 400 1850 (1.1) 600 (0.4)     Stool 0       Total Output 400 1850 600     Net +850 -1430 +603             Stool Unmeasured Occurrence 1 x           Lines, Drains & Airways    Active LDAs     Name:   Placement date:   Placement time:   Site:   Days:    PICC Single Lumen 01/05/20 Left   01/05/20    1155    --   1    Urethral Catheter   01/05/20 PRESENT UPON ARRIVAL     1157     1                       /68   Pulse 87   Temp 98.2 °F (36.8 °C) (Oral)   Resp 20   Ht 180.3 cm (71\")   Wt 71.3 kg (157 lb 3 oz)   SpO2 96%   BMI 21.92 kg/m²   Intake/Output last 3 shifts:  I/O last 3 completed shifts:  In: 1443 [P.O.:1100; I.V.:243; IV Piggyback:100]  Out: 1700 [Urine:1700]  Intake/Output this shift:  No intake/output data recorded.    PHYSICAL EXAM:    General: Well-developed, well-nourished 74-year-old  male who is alert, cooperative, no distress, appears stated age  Head:  Normocephalic, atraumatic, mucous membranes " moist  Eyes:  Conjunctiva/corneas clear, EOM's intact     Neck:  Supple,  no adenopathy;      Lungs: Clear to auscultation bilaterally, no wheezes rhonchi rales are noted  Chest wall: No tenderness  Heart::  Regular rate and rhythm, S1 and S2 normal, no murmur, rub or gallop  Abdomen: Soft, non-tender, nondistended bowel sounds active  Extremities: Right above-the-knee amputation, left lower extremity with severe muscle atrophy, dressing to foot  Pulses: 2+ radial pulses  Skin:  No rashes or lesions  Neuro/psych: A&O x3. CN II through XII are grossly intact with appropriate affect      Scheduled Meds:        aspirin 325 mg Oral Daily   atorvastatin 40 mg Oral Daily   gabapentin 100 mg Oral BID   hydrocortisone 1 application Topical Q12H   insulin glargine 10 Units Subcutaneous Nightly   insulin lispro 0-9 Units Subcutaneous 4x Daily With Meals & Nightly   metoprolol tartrate 50 mg Oral TID   pantoprazole 40 mg Oral QAM   piperacillin-tazobactam 3.375 g Intravenous Q8H   sodium chloride 10 mL Intravenous Q12H   tamsulosin 0.4 mg Oral Nightly       Continuous Infusions:         PRN Meds:    dextrose  •  dextrose  •  docusate sodium  •  glucagon (human recombinant)  •  HYDROcodone-acetaminophen  •  insulin lispro **AND** insulin lispro  •  ipratropium-albuterol  •  ketamine (KETALAR) infusion **AND** Ketamine Vital Signs & Assessment  •  nitroglycerin  •  ondansetron **OR** ondansetron  •  [COMPLETED] Insert peripheral IV **AND** sodium chloride  •  sodium chloride        Results Review:     I reviewed the patient's new clinical results.    CBC    Results from last 7 days   Lab Units 01/08/20  0442 01/07/20  0603 01/06/20  0606 01/05/20  1227   WBC 10*3/mm3 8.70 12.20* 14.00* 21.10*   HEMOGLOBIN g/dL 8.8* 9.6* 9.7* 10.9*   PLATELETS 10*3/mm3 359 395 468* 487*     Cr Clearance Estimated Creatinine Clearance: 80.5 mL/min (A) (by C-G formula based on SCr of 0.59 mg/dL (L)).  Coag   Results from last 7 days   Lab Units  01/05/20  1227   INR  1.03   APTT seconds 20.5*     HbA1C   Lab Results   Component Value Date    HGBA1C 8.3 (H) 07/25/2019    HGBA1C 10.5 (H) 09/24/2018     Blood Glucose   Glucose   Date/Time Value Ref Range Status   01/08/2020 0724 184 (H) 70 - 105 mg/dL Final     Comment:     Serial Number: [id number]:  [id number]   01/07/2020 2054 328 (H) 70 - 105 mg/dL Final     Comment:     Serial Number: [id number]:  [id number]   01/07/2020 1627 232 (H) 70 - 105 mg/dL Final     Comment:     Serial Number: [id number]:  [id number]   01/07/2020 1129 155 (H) 70 - 105 mg/dL Final     Comment:     Serial Number: [id number]:  [id number]   01/07/2020 0702 96 70 - 105 mg/dL Final     Comment:     Serial Number: [id number]:  [id number]   01/06/2020 2012 312 (H) 70 - 105 mg/dL Final     Comment:     Serial Number: [id number]:  [id number]   01/06/2020 1658 222 (H) 70 - 105 mg/dL Final     Comment:     Serial Number: [id number]:  [id number]   01/06/2020 1133 263 (H) 70 - 105 mg/dL Final     Comment:     Serial Number: [id number]:  [id number]     Infection   Results from last 7 days   Lab Units 01/06/20  1011 01/05/20  1436 01/05/20  1430 01/05/20  1421   BLOODCX  Enterococcus species*  --  Enterococcus faecalis* Enterococcus faecalis*   URINECX   --  >100,000 CFU/mL Escherichia coli*  --   --    BCIDPCR  Enterococcus spp. Identification by BCID PCR.*  --   --  Enterococcus spp. Identification by BCID PCR.*     CMP   Results from last 7 days   Lab Units 01/08/20  0442 01/07/20  0603 01/06/20  0613 01/05/20  1227   SODIUM mmol/L 135* 134* 130* 134*   POTASSIUM mmol/L 4.2 4.0 5.2 4.4   CHLORIDE mmol/L 101 98 95* 99   CO2 mmol/L 26.0 27.0 21.0* 20.0*   BUN mg/dL 27* 27* 37* 20   CREATININE mg/dL 0.59* 0.60* 0.73* 0.67*   GLUCOSE mg/dL 231* 114* 380* 377*   ALBUMIN g/dL  --   --   --  3.30*   BILIRUBIN mg/dL  --   --   --  0.2   ALK PHOS U/L  --   --   --  190*   AST (SGOT) U/L  --   --   --   56*   ALT (SGPT) U/L  --   --   --  48*     ABG      UA    Results from last 7 days   Lab Units 01/05/20  1436   NITRITE UA  Positive*   WBC UA /HPF Too Numerous to Count*   BACTERIA UA /HPF 1+*   SQUAM EPITHEL UA /HPF 0-2   URINECX  >100,000 CFU/mL Escherichia coli*     NIRMALA  No results found for: POCMETH, POCAMPHET, POCBARBITUR, POCBENZO, POCCOCAINE, POCOPIATES, POCOXYCODO, POCPHENCYC, POCPROPOXY, POCTHC, POCTRICYC  Lysis Labs   Results from last 7 days   Lab Units 01/08/20  0442 01/07/20  0603 01/06/20  0613 01/06/20  0606 01/05/20  1227   INR   --   --   --   --  1.03   APTT seconds  --   --   --   --  20.5*   HEMOGLOBIN g/dL 8.8* 9.6*  --  9.7* 10.9*   PLATELETS 10*3/mm3 359 395  --  468* 487*   CREATININE mg/dL 0.59* 0.60* 0.73*  --  0.67*     Radiology(recent) Ct Abdomen Pelvis With & Without Contrast    Result Date: 1/6/2020   1.  Left-sided ureteral stent appropriately positioned.  No evidence of hydronephrosis.  No definite renal or ureteral stone identified. 2.  Status post distal aorto bifemoral bypass graft placement.  The graft appears to be completely occluded just below the level of the renal arteries.  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  Portion of the left common femoral artery appears to be reconstituted by multiple small collaterals.  The right common femoral artery does not appear to be reconstituted. 3.  Moderate-sized left ventricular aneurysm 4.  Severe muscular atrophy of the pelvis and upper thighs 5.  Bilateral pleural effusions and bilateral lower lobe airspace disease 6.  Small pericardial effusion.  Electronically Signed By-Ted Roberts On:1/6/2020 11:18 PM This report was finalized on [id number] by  Ted Roberts, .        Results from last 7 days   Lab Units 01/07/20  0603  01/06/20  0613   CK TOTAL U/L 79   < > 94   TROPONIN T ng/mL  --   --  0.463*    < > = values in this interval not displayed.       Xrays, labs reviewed  personally by physician.    ECG/EMG Results (most recent)     Procedure Component Value Units Date/Time    Transthoracic Echo Complete With Contrast if Necessary Per Protocol [[id number]] Collected:  01/06/20 1255     Updated:  01/06/20 1535     BSA 1.9 m^2       CV ECHO ANGELIA - RVDD 2.7 cm      IVSd 1.0 cm      LVIDd 5.9 cm      LVIDs 5.6 cm      LVPWd 1.4 cm      IVS/LVPW 0.75     FS 4.7 %      EDV(Teich) 174.6 ml      ESV(Teich) 156.3 ml      EF(Teich) 10.5 %      EDV(cubed) 207.6 ml      ESV(cubed) 179.6 ml      EF(cubed) 13.5 %      LV mass(C)d 306.7 grams      LV mass(C)dI 161.7 grams/m^2      SV(Teich) 18.3 ml      SI(Teich) 9.6 ml/m^2      SV(cubed) 28.0 ml      SI(cubed) 14.7 ml/m^2      Ao root diam 3.4 cm      Ao root area 9.2 cm^2      asc Aorta Diam 3.0 cm      LVOT diam 2.2 cm      LVOT area 3.8 cm^2      RVOT diam 2.6 cm      RVOT area 5.3 cm^2      EDV(MOD-sp4) 125.9 ml      ESV(MOD-sp4) 86.6 ml      EF(MOD-sp4) 31.2 %      SV(MOD-sp4) 39.3 ml      SI(MOD-sp4) 20.7 ml/m^2      Ao root area (BSA corrected) 1.8     LV Humphries Vol (BSA corrected) 66.3 ml/m^2      LV Sys Vol (BSA corrected) 45.6 ml/m^2      Aortic R-R 0.5 sec      Aortic .4 BPM      MV E max miguelito 48.2 cm/sec      MV A max miguelito 99.7 cm/sec      MV E/A 0.48     MV V2 max 116.2 cm/sec      MV max PG 5.4 mmHg      MV V2 mean 74.0 cm/sec      MV mean PG 2.5 mmHg      MV V2 VTI 16.5 cm      MVA(VTI) 3.1 cm^2      MV dec slope 1,140 cm/sec^2      MV dec time 0.04 sec      Ao pk miguelito 184.8 cm/sec      Ao max PG 13.7 mmHg      Ao max PG (full) 10.3 mmHg      Ao V2 mean 112.7 cm/sec      Ao mean PG 6.3 mmHg      Ao mean PG (full) 5.0 mmHg      Ao V2 VTI 22.3 cm      SUSY(I,A) 2.3 cm^2      SUSY(I,D) 2.3 cm^2      SUSY(V,A) 1.9 cm^2      SUSY(V,D) 1.9 cm^2      LV V1 max PG 3.4 mmHg      LV V1 mean PG 1.3 mmHg      LV V1 max 91.7 cm/sec      LV V1 mean 49.7 cm/sec      LV V1 VTI 13.6 cm      MR max miguelito 456.8 cm/sec      MR max PG 83.5 mmHg      CO(Ao)  24.6 l/min      CI(Ao) 13.0 l/min/m^2      SV(Ao) 206.0 ml      SI(Ao) 108.6 ml/m^2      CO(LVOT) 6.1 l/min      CI(LVOT) 3.2 l/min/m^2      SV(LVOT) 51.5 ml      SV(RVOT) 48.4 ml      SI(LVOT) 27.1 ml/m^2      PA V2 max 77.5 cm/sec      PA max PG 2.4 mmHg      PA max PG (full) 0.42 mmHg      PA V2 mean 50.0 cm/sec      PA mean PG 1.2 mmHg      PA mean PG (full) 0.35 mmHg      PA V2 VTI 11.6 cm      PVA(I,A) 4.2 cm^2       CV ECHO ANGELIA - PVA(I,D) 4.2 cm^2       CV ECHO ANGELIA - PVA(V,A) 4.8 cm^2       CV ECHO ANGELIA - PVA(V,D) 4.8 cm^2      PA acc time 0.08 sec      RV V1 max PG 2.0 mmHg      RV V1 mean PG 0.86 mmHg      RV V1 max 70.4 cm/sec      RV V1 mean 40.6 cm/sec      RV V1 VTI 9.1 cm      TR max miguelito 167.4 cm/sec      RVSP(TR) 14.2 mmHg      RAP systole 3.0 mmHg      PA pr(Accel) 44.7 mmHg      Qp/Qs 0.94      CV ECHO ANGELIA - BZI_BMI 21.8 kilograms/m^2       CV ECHO ANGELIA - BSA([facility]) 1.9 m^2       CV ECHO ANGELIA - BZI_METRIC_WEIGHT 70.8 kg       CV ECHO ANGELIA - BZI_METRIC_HEIGHT 180.3 cm      EF(MOD-bp) 31.0 %      LA dimension(2D) 4.7 cm      Echo EF Estimated 35 %     Narrative:       · The left ventricular cavity is mild-to-moderately dilated.  · Left ventricular wall thickness is consistent with mild concentric   hypertrophy.  · The following left ventricular wall segments are hypokinetic: mid   anterior, apical anterior, apical septal, mid inferoseptal, apex   hypokinetic, mid anteroseptal and basal anterior. The following left   ventricular wall segments are akinetic: basal anterolateral, mid   anterolateral, apical lateral, basal inferolateral, mid inferolateral,   apical inferior, basal inferior, mid inferior and basal inferoseptal.  · There is a small (<1cm) pericardial effusion.  · Estimated EF = 35%.  · Left ventricular diastolic dysfunction (grade I) consistent with   impaired relaxation.  · Left atrial cavity size is mild-to-moderately dilated.  · Mild mitral valve regurgitation is  present  · Mild tricuspid valve regurgitation is present.       ECG 12 Lead [[id number]] Collected:  01/05/20 1150     Updated:  01/06/20 1622    Narrative:       HEART RATE= 118  bpm  RR Interval= 508  ms  LA Interval= 150  ms  P Horizontal Axis= -10  deg  P Front Axis= 64  deg  QRSD Interval= 109  ms  QT Interval= 350  ms  QRS Axis= 22  deg  T Wave Axis= -70  deg  - ABNORMAL ECG -  Sinus tachycardia  Incomplete left bundle branch block  Inferior infarct, age indeterminate  No previous ECG available for comparison  Electronically Signed By: Mickey Colon ([facility]) 06-Jan-2020 16:22:08  Date and Time of Study: 2020-01-05 11:50:25            Medication Review:   I have reviewed the patient's current medication list  Scheduled Meds:    aspirin 325 mg Oral Daily   atorvastatin 40 mg Oral Daily   gabapentin 100 mg Oral BID   hydrocortisone 1 application Topical Q12H   insulin glargine 10 Units Subcutaneous Nightly   insulin lispro 0-9 Units Subcutaneous 4x Daily With Meals & Nightly   metoprolol tartrate 50 mg Oral TID   pantoprazole 40 mg Oral QAM   piperacillin-tazobactam 3.375 g Intravenous Q8H   sodium chloride 10 mL Intravenous Q12H   tamsulosin 0.4 mg Oral Nightly     Continuous Infusions:   PRN Meds:.dextrose  •  dextrose  •  docusate sodium  •  glucagon (human recombinant)  •  HYDROcodone-acetaminophen  •  insulin lispro **AND** insulin lispro  •  ipratropium-albuterol  •  ketamine (KETALAR) infusion **AND** Ketamine Vital Signs & Assessment  •  nitroglycerin  •  ondansetron **OR** ondansetron  •  [COMPLETED] Insert peripheral IV **AND** sodium chloride  •  sodium chloride    Imaging:  Imaging Results (Last 72 Hours)     Procedure Component Value Units Date/Time    CT Abdomen Pelvis With & Without Contrast [[id number]] Collected:  01/06/20 2303     Updated:  01/06/20 2320    Narrative:          DATE OF EXAM:  1/6/2020 10:43 PM     PROCEDURE:  CT ABDOMEN PELVIS W WO CONTRAST-     INDICATIONS:  UTI; L ureteral stricture  s/p stent; R07.9-Chest pain, unspecified;  A41.9-Sepsis, unspecified organism; D72.829-Elevated white blood cell  count, unspecified     COMPARISON:   No Comparisons Available     TECHNIQUE:   Multiple axial images were obtained through the abdomen and pelvis  without the administration of contrast.  Additional scanning through the  abdomen and pelvis followed by the  intravenous administration of 100 mL  of Isovue 370. Reconstructed coronal and sagittal images were also  obtained. Automated exposure control and iterative construction methods  were used.     FINDINGS:  There are small bilateral pleural effusions, right greater than left.   There is bilateral lower lobe airspace disease, right greater than left  likely due to atelectasis.  Superimposed pneumonia cannot be entirely  excluded.  There is extensive coronary artery calcification.  There is  also aortic valvular calcification.  There is small pericardial  effusion.  There is an irregular shaped left ventricular aneurysm  arising from the posterior inferior surface of the left ventricle.   There are extensive renal vascular calcifications.  No definite renal or  ureteral stone identified.  There is a left-sided ureteral stent which  appears to be appropriately positioned within the left renal pelvis with  the distal portion of the stent within the urinary bladder.  There is no  hydronephrosis.  There is a 1.4 cm low-density mass of the upper pole  the right kidney compatible with a cyst.  Multiple smaller low density  masses are noted of both kidneys also compatible with cyst.  No  right-sided hydronephrosis is seen.  Bilateral adrenal glands are within  normal limits.  Liver and pancreas are within normal limits.  There is a  well-circumscribed low-density 1 7 m mass centrally within the spleen  likely a cyst or possibly a hemangioma.  There are splenic granulomas.   Gallbladder is at the upper limits of normal in size.  No inflammatory  change seen around  the gallbladder.  Upper GI tract appears within  normal limits.  There is atherosclerotic vascular calcification of the  aorta and abdominal branch vessels.  There is approximately 50% stenosis  of the celiac artery.  Patient is status post aorto bifemoral bypass  graft placement.  There appears to be complete occlusion of the native  aorta and graft just below the renal arteries.  The low left common  femoral artery appears to be reconstituted by small peripheral  collaterals.  Right common femoral artery does not appear to be  reconstituted during this phase of imaging.  There appears to be a  pseudoaneurysm at the left common femoral anastomosis.  Pseudoaneurysm  measures proximal a 2.4 cm in size.     Pelvis: Cedeno catheter is in place within the urinary bladder which is  decompressed.  There is a small amount of free fluid in the pelvis of  uncertain etiology.  GI tract appears within normal limits.  The  appendix appears normal.  No pelvic or inguinal adenopathy identified.   There is marked muscular atrophy of the gluteal and thigh muscles.   There are postoperative changes of the left femur related to prior  fracture repair.  There are old healed fractures of the superior and  inferior pubic rami bilaterally.  There are severe degenerative changes  of the hips and lumbar spine.  Patient is status post fusion from the  L1-L5 levels.  There are also posterior fusion rods and laminar hooks  within the lower thoracic spine.  No lytic or sclerotic bony lesion  identified.       Impression:          1.  Left-sided ureteral stent appropriately positioned.  No evidence of  hydronephrosis.  No definite renal or ureteral stone identified.  2.  Status post distal aorto bifemoral bypass graft placement.  The  graft appears to be completely occluded just below the level of the  renal arteries.  There is a pseudoaneurysm measuring 2.4 cm in size of  the left common femoral anastomosis.  Portion of the left common  femoral  artery appears to be reconstituted by multiple small collaterals.  The  right common femoral artery does not appear to be reconstituted.  3.  Moderate-sized left ventricular aneurysm  4.  Severe muscular atrophy of the pelvis and upper thighs  5.  Bilateral pleural effusions and bilateral lower lobe airspace  disease  6.  Small pericardial effusion.     Electronically Signed ByShilo Roberts On:1/6/2020 11:18 PM  This report was finalized on [id number] by  Ted Roberts, .    XR Chest 1 View [[id number]] Collected:  01/05/20 1309     Updated:  01/05/20 1312    Narrative:       DATE OF EXAM:  1/5/2020 12:59 PM     PROCEDURE:  XR CHEST 1 VW-     INDICATIONS:  Chest pain, past tobacco abuse.      COMPARISON:  No Comparisons Available     TECHNIQUE:   Single radiographic view of the chest was obtained.     FINDINGS:  The heart size is normal. The lung volumes are diminished. The pulmonary  vascular markings are felt to be normal. The lungs and pleural spaces  are clear of active disease. There is a left upper extremity PICC line  in place with the tip terminating in superior vena cava.  There are  chronic age-related changes involving the bony thorax and thoracic  aorta. There is fusion hardware seen within the visualized lower  thoracic spine.       Impression:          1. Low lung volumes.  2. No active pulmonary disease.     Electronically Signed By-John Chavira On:1/5/2020 1:10 PM  This report was finalized on [id number] by  John Chavira, .            VASQUEZ Narayan  01/08/20  10:18 AM       Electronically signed by VASQUEZ Narayan, 01/08/20, 10:18 AM.

## 2020-01-09 LAB
BACTERIA SPEC AEROBE CULT: ABNORMAL
GRAM STN SPEC: ABNORMAL
GRAM STN SPEC: ABNORMAL
ISOLATED FROM: ABNORMAL

## 2020-01-09 NOTE — PROGRESS NOTES
Case Management Discharge Note      Final Note: Patient transfered to [facility].      Final Discharge Disposition Code: 02 - short [facility] for [facility]

## 2020-01-09 NOTE — NURSING NOTE
Pt leaving w/ EMS to transfer to [facility]. Pt in no distress, leaving w/ martinez cath, peripheral IV in place. 2L/NC d/t frequent drops in SpO2 w/ sleep.    Spouse states she has been highly satisfied w/ care here.

## 2020-01-09 NOTE — PLAN OF CARE
Pt currently preparing for transfer to [facility]        Problem: Fall Risk (Adult)  Goal: Absence of Fall  Outcome: Outcome(s) achieved  Flowsheets (Taken 1/8/2020 2033)  Absence of Fall: achieves outcome     Problem: Patient Care Overview  Goal: Plan of Care Review  Outcome: Outcome(s) achieved  Flowsheets  Taken 1/8/2020 2033 by Mika Miller RN  Progress: improving  Outcome Summary: some improvememt of Sx - pt in need of reveiw by original vascular surgeon, thus transfering per pt / spouse request to be seen  Taken 1/8/2020 1626 by Lanette Montero PTA  Plan of Care Reviewed With: patient;spouse     Problem: Skin Injury Risk (Adult)  Goal: Skin Health and Integrity  Outcome: Outcome(s) achieved  Flowsheets (Taken 1/8/2020 2033)  Skin Health and Integrity: achieves outcome

## 2020-01-10 LAB
BH CV ECHO MEAS - AO MAX PG (FULL): 36.9 MMHG
BH CV ECHO MEAS - AO MAX PG: 40.8 MMHG
BH CV ECHO MEAS - AO MEAN PG (FULL): 20.1 MMHG
BH CV ECHO MEAS - AO MEAN PG: 21.9 MMHG
BH CV ECHO MEAS - AO V2 MAX: 319.4 CM/SEC
BH CV ECHO MEAS - AO V2 MEAN: 216.6 CM/SEC
BH CV ECHO MEAS - AO V2 VTI: 63 CM
BH CV ECHO MEAS - AVA(I,A): 1.1 CM^2
BH CV ECHO MEAS - AVA(I,D): 1.1 CM^2
BH CV ECHO MEAS - AVA(V,A): 1.2 CM^2
BH CV ECHO MEAS - AVA(V,D): 1.2 CM^2
BH CV ECHO MEAS - BSA(HAYCOCK): 1.9 M^2
BH CV ECHO MEAS - BSA: 1.9 M^2
BH CV ECHO MEAS - BZI_BMI: 21.6 KILOGRAMS/M^2
BH CV ECHO MEAS - BZI_METRIC_HEIGHT: 180.3 CM
BH CV ECHO MEAS - BZI_METRIC_WEIGHT: 70.3 KG
BH CV ECHO MEAS - LV MAX PG: 3.9 MMHG
BH CV ECHO MEAS - LV MEAN PG: 1.8 MMHG
BH CV ECHO MEAS - LV V1 MAX: 98.2 CM/SEC
BH CV ECHO MEAS - LV V1 MEAN: 62.6 CM/SEC
BH CV ECHO MEAS - LV V1 VTI: 17 CM
BH CV ECHO MEAS - LVOT AREA: 4 CM^2
BH CV ECHO MEAS - LVOT DIAM: 2.3 CM
BH CV ECHO MEAS - MR MAX PG: 145 MMHG
BH CV ECHO MEAS - MR MAX VEL: 602.2 CM/SEC
BH CV ECHO MEAS - SI(LVOT): 36.3 ML/M^2
BH CV ECHO MEAS - SV(LVOT): 68.7 ML
CATHETER CULTURE: NORMAL

## 2020-01-10 PROCEDURE — 93312 ECHO TRANSESOPHAGEAL: CPT | Performed by: INTERNAL MEDICINE

## 2020-01-10 PROCEDURE — 93325 DOPPLER ECHO COLOR FLOW MAPG: CPT | Performed by: INTERNAL MEDICINE

## 2020-01-10 PROCEDURE — 93320 DOPPLER ECHO COMPLETE: CPT | Performed by: INTERNAL MEDICINE

## 2020-01-12 LAB
BACTERIA SPEC AEROBE CULT: NORMAL
BACTERIA SPEC AEROBE CULT: NORMAL